# Patient Record
Sex: FEMALE | Race: WHITE | NOT HISPANIC OR LATINO | Employment: UNEMPLOYED | URBAN - METROPOLITAN AREA
[De-identification: names, ages, dates, MRNs, and addresses within clinical notes are randomized per-mention and may not be internally consistent; named-entity substitution may affect disease eponyms.]

---

## 2023-06-15 ENCOUNTER — GYNECOLOGY VISIT (OUTPATIENT)
Dept: OBGYN | Facility: CLINIC | Age: 28
End: 2023-06-15
Payer: MEDICAID

## 2023-06-15 VITALS
HEIGHT: 64 IN | DIASTOLIC BLOOD PRESSURE: 70 MMHG | SYSTOLIC BLOOD PRESSURE: 118 MMHG | WEIGHT: 145.6 LBS | BODY MASS INDEX: 24.86 KG/M2

## 2023-06-15 DIAGNOSIS — Z86.79 HISTORY OF PERICARDITIS: ICD-10-CM

## 2023-06-15 DIAGNOSIS — Z98.891 HISTORY OF C-SECTION: ICD-10-CM

## 2023-06-15 DIAGNOSIS — Z88.9 MULTIPLE ALLERGIES: ICD-10-CM

## 2023-06-15 DIAGNOSIS — N93.8 DUB (DYSFUNCTIONAL UTERINE BLEEDING): ICD-10-CM

## 2023-06-15 DIAGNOSIS — O09.291 HISTORY OF MACROSOMIA IN INFANT IN PRIOR PREGNANCY, CURRENTLY PREGNANT IN FIRST TRIMESTER: ICD-10-CM

## 2023-06-15 DIAGNOSIS — O21.9 NAUSEA/VOMITING IN PREGNANCY: ICD-10-CM

## 2023-06-15 DIAGNOSIS — O09.291 HX OF PREECLAMPSIA, PRIOR PREGNANCY, CURRENTLY PREGNANT, FIRST TRIMESTER: ICD-10-CM

## 2023-06-15 LAB
POCT INT CON NEG: NEGATIVE
POCT INT CON POS: POSITIVE
POCT URINE PREGNANCY TEST: POSITIVE

## 2023-06-15 PROCEDURE — 76805 OB US >/= 14 WKS SNGL FETUS: CPT | Performed by: PHYSICIAN ASSISTANT

## 2023-06-15 PROCEDURE — 3074F SYST BP LT 130 MM HG: CPT | Performed by: PHYSICIAN ASSISTANT

## 2023-06-15 PROCEDURE — 3078F DIAST BP <80 MM HG: CPT | Performed by: PHYSICIAN ASSISTANT

## 2023-06-15 PROCEDURE — 99203 OFFICE O/P NEW LOW 30 MIN: CPT | Performed by: PHYSICIAN ASSISTANT

## 2023-06-15 PROCEDURE — 81025 URINE PREGNANCY TEST: CPT | Performed by: PHYSICIAN ASSISTANT

## 2023-06-15 ASSESSMENT — ENCOUNTER SYMPTOMS
VOMITING: 1
NAUSEA: 1

## 2023-06-15 NOTE — PROGRESS NOTES
Patient here for GYN/DUB  LMP: 2/20/2023  KIMBERLY: 11/27/2023  GA 16w3d  Last pap: 5 yrs ago WNL   Pt states having nausea, vomiting and cramping.   # [phone]  UPT positive

## 2023-06-15 NOTE — PROGRESS NOTES
"Subjective     Khris Mays is a 28 y.o. female who presents with Gynecologic Exam ()  Pt is sure of LMP within a few days. Dating is by LMP c/w US today.   OBHx: Had IOL at 40 wk for preeclampsia, dilated to 8cm then needed primary C/S for FTP (done at [facility]). Pt denies GDM or delivery complications, though baby weighed 9lb3oz. Pt also had \"opening\" of incision (per pt ~2cm) with tunneling 3 wks after delivery, needed wound dressing though pt states she was told she needed another surgery.  PMHx: Pericarditis at 18yo - admitted with arrhythmia. Last seen cardiology 2018 with pregnancy, had Holter monitor, no treatment needed.    SHX: C/S 2018, appendectomy and T&A as child - per pt no surgical or anesthetic complications  Allergies: Tape, PCN (\"I could die\" if taken), \"all vaccines\" cause her to get fever and go to hospital.   Social: Denies tob, etoh or drug use   Meds; Taking PNV only, will add ASA 81mg today  Pt currently denies bleeding or pain though pt was seen in [place] for one episode of bleeding ~3 wks ago, none since. Pt also has had lower abd cramping and significant N/V, unable to eat \"almost anything.\" Handout given and pt to try Unisom and B6, but will call if rx meds are needed. Cramping likely from dehydration, pt to incr water intake when N/V improving. No FM.             HPI    Review of Systems   Gastrointestinal:  Positive for nausea and vomiting.   All other systems reviewed and are negative.             Objective     /70   Ht 5' 4\"   Wt 145 lb 9.6 oz   LMP 02/20/2023   BMI 24.99 kg/m²      Physical Exam  Vitals reviewed.   Constitutional:       Appearance: She is well-developed.   HENT:      Head: Normocephalic and atraumatic.   Eyes:      Pupils: Pupils are equal, round, and reactive to light.   Neck:      Thyroid: No thyromegaly.   Cardiovascular:      Rate and Rhythm: Normal rate and regular rhythm.      Heart sounds: Normal heart sounds.   Pulmonary:      Effort: " "Pulmonary effort is normal. No respiratory distress.      Breath sounds: Normal breath sounds.   Abdominal:      General: Bowel sounds are normal. There is no distension.      Palpations: Abdomen is soft.      Tenderness: There is no abdominal tenderness.   Genitourinary:     Exam position: Supine.      Uterus: Enlarged (Gravid, uterus c/w 16-17 wk size). Not deviated and not tender.    Musculoskeletal:      Cervical back: Normal range of motion and neck supple.   Skin:     General: Skin is warm and dry.      Findings: No erythema.   Neurological:      Mental Status: She is alert.      Deep Tendon Reflexes: Reflexes are normal and symmetric.   Psychiatric:         Behavior: Behavior normal.         Thought Content: Thought content normal.            Abd US shows single viable IUP at 16w6d with KIMBERLY 11/24/23. +FM, +cardiac activity at 145bpm. post placenta, BEBETO subjectively wnl.      BPD: 3.64cm (3.53, 3.78. 3.61)  HC: 13.43cm (13.42, 13.32, 13.56)  AC: 10.87cm (10.91, 10.82)  FL: 2.26cm (2.29, 2.15, 2.35)    Per ACOG guidelines, I would recommend keeping the KIMBERLY based on Patient's last menstrual period was 02/20/2023. as per todays US, the KIMBERLY is only 3 days different. FINAL KIMBERLY 11/27/2023 based on LMP.            Assessment & Plan        1. DUB (dysfunctional uterine bleeding)  - F/u 1-2 wk for NOB visit, will need PAP, PNL, baseline PIH labs, early 1hr GTT  - POCT Pregnancy    2. Hx of preeclampsia, prior pregnancy - IOL at 40wk, ASA started  - Started on ASA 81mg today, will need baseline PIH labs    3. History of pericarditis - last seen cardiology 2018  - Will need cardiology referral at NOB visit    4. History of C/S x 1 for FTP - unsure TOLAC vs Repeat, unsure BTL  - Info given to pt today, risks d/w pt.     5. Multiple allergies - \"all vaccines\" and PCN, tape    6. Nausea/vomiting in pregnancy  - Handout given, Unisom and B6 recommended, pt to call if not improving    7. Hx macrosomia  - Early 1hr GTT needed " at next visit, different FOB

## 2023-06-22 ENCOUNTER — APPOINTMENT (OUTPATIENT)
Dept: OBGYN | Facility: CLINIC | Age: 28
End: 2023-06-22

## 2023-06-23 ENCOUNTER — HOSPITAL ENCOUNTER (OUTPATIENT)
Facility: MEDICAL CENTER | Age: 28
End: 2023-06-23
Attending: PHYSICIAN ASSISTANT
Payer: MEDICAID

## 2023-06-23 ENCOUNTER — INITIAL PRENATAL (OUTPATIENT)
Dept: OBGYN | Facility: CLINIC | Age: 28
End: 2023-06-23
Payer: MEDICAID

## 2023-06-23 VITALS — SYSTOLIC BLOOD PRESSURE: 100 MMHG | WEIGHT: 145 LBS | DIASTOLIC BLOOD PRESSURE: 60 MMHG | BODY MASS INDEX: 24.89 KG/M2

## 2023-06-23 DIAGNOSIS — O09.92 SUPERVISION OF HIGH RISK PREGNANCY, ANTEPARTUM, SECOND TRIMESTER: ICD-10-CM

## 2023-06-23 DIAGNOSIS — O09.291 HX OF PREECLAMPSIA, PRIOR PREGNANCY, CURRENTLY PREGNANT, FIRST TRIMESTER: ICD-10-CM

## 2023-06-23 DIAGNOSIS — Z86.79 HISTORY OF PERICARDITIS: ICD-10-CM

## 2023-06-23 DIAGNOSIS — O09.291 HISTORY OF MACROSOMIA IN INFANT IN PRIOR PREGNANCY, CURRENTLY PREGNANT IN FIRST TRIMESTER: ICD-10-CM

## 2023-06-23 DIAGNOSIS — Z98.891 HISTORY OF C-SECTION: ICD-10-CM

## 2023-06-23 PROBLEM — N93.8 DUB (DYSFUNCTIONAL UTERINE BLEEDING): Status: RESOLVED | Noted: 2023-06-15 | Resolved: 2023-06-23

## 2023-06-23 PROCEDURE — 88175 CYTOPATH C/V AUTO FLUID REDO: CPT

## 2023-06-23 PROCEDURE — 0500F INITIAL PRENATAL CARE VISIT: CPT | Performed by: PHYSICIAN ASSISTANT

## 2023-06-23 PROCEDURE — 3078F DIAST BP <80 MM HG: CPT | Performed by: PHYSICIAN ASSISTANT

## 2023-06-23 PROCEDURE — 3074F SYST BP LT 130 MM HG: CPT | Performed by: PHYSICIAN ASSISTANT

## 2023-06-23 PROCEDURE — 87591 N.GONORRHOEAE DNA AMP PROB: CPT

## 2023-06-23 PROCEDURE — 87491 CHLMYD TRACH DNA AMP PROBE: CPT

## 2023-06-23 ASSESSMENT — EDINBURGH POSTNATAL DEPRESSION SCALE (EPDS)
I HAVE FELT SAD OR MISERABLE: NOT VERY OFTEN
I HAVE BEEN SO UNHAPPY THAT I HAVE BEEN CRYING: NO, NEVER
THE THOUGHT OF HARMING MYSELF HAS OCCURRED TO ME: NEVER
THINGS HAVE BEEN GETTING ON TOP OF ME: NO, MOST OF THE TIME I HAVE COPED QUITE WELL
I HAVE BLAMED MYSELF UNNECESSARILY WHEN THINGS WENT WRONG: NO, NEVER
TOTAL SCORE: 3
I HAVE FELT SCARED OR PANICKY FOR NO GOOD REASON: NO, NOT AT ALL
I HAVE LOOKED FORWARD WITH ENJOYMENT TO THINGS: AS MUCH AS I EVER DID
I HAVE BEEN ANXIOUS OR WORRIED FOR NO GOOD REASON: HARDLY EVER
I HAVE BEEN SO UNHAPPY THAT I HAVE HAD DIFFICULTY SLEEPING: NOT AT ALL
I HAVE BEEN ABLE TO LAUGH AND SEE THE FUNNY SIDE OF THINGS: AS MUCH AS I ALWAYS COULD

## 2023-06-23 ASSESSMENT — ENCOUNTER SYMPTOMS
NAUSEA: 1
VOMITING: 1

## 2023-06-23 NOTE — PROGRESS NOTES
Patient here for  new DUB.  LMP=2/20/23  KIMBERLY= 11/27/23 by LMP  GA= 17w 4d  Last pap: needs one today   Phone number: [phone]  Pharmacy verified  No concerns at the moment.   EPDS : 3

## 2023-06-23 NOTE — PROGRESS NOTES
"Subjective       Khris Mays is a 28 y.o. female who presents with NOB visit today. Pt is sure of LMP within a few days. Dating is by LMP c/w US at 16wks.   OBHx: Had IOL at 40 wk for preeclampsia, dilated to 8cm then needed primary C/S for FTP (done at [facility]). Pt denies GDM or delivery complications, though baby weighed 9lb3oz. Pt also had \"opening\" of incision (per pt ~2cm) with tunneling 3 wks after delivery, needed wound dressing though pt states she was told she needed another surgery.  PMHx: Pericarditis at 18yo - admitted with arrhythmia. Last seen cardiology 2018 with pregnancy, had Holter monitor, no treatment needed.    SHX: C/S 2018, appendectomy and T&A as child - per pt no surgical or anesthetic complications  Allergies: Tape, PCN (\"I could die\" if taken), \"all vaccines\" cause her to get fever and go to hospital.   Social: Denies tob, etoh or drug use   Meds; Taking PNV only, ASA 81mg  Pt currently denies bleeding or pain though pt was seen in [place] for one episode of bleeding ~3 wks ago, none since. Pt also has had lower abd cramping and significant N/V, unable to eat \"almost anything.\" Handout given and pt trying Unisom and B6 with a little relief - will call if rx meds are needed. No FM.     HPI    Review of Systems   Gastrointestinal:  Positive for nausea and vomiting.   All other systems reviewed and are negative.             Objective     /60   Wt 145 lb   LMP 02/20/2023   BMI 24.89 kg/m²      Physical Exam  Vitals reviewed.   Constitutional:       Appearance: She is well-developed.   HENT:      Head: Normocephalic and atraumatic.   Eyes:      Pupils: Pupils are equal, round, and reactive to light.   Neck:      Thyroid: No thyromegaly.   Cardiovascular:      Rate and Rhythm: Normal rate and regular rhythm.      Heart sounds: Normal heart sounds.   Pulmonary:      Effort: Pulmonary effort is normal. No respiratory distress.      Breath sounds: Normal breath sounds.   Abdominal: "      General: Bowel sounds are normal. There is no distension.      Palpations: Abdomen is soft.      Tenderness: There is no abdominal tenderness.   Genitourinary:     Exam position: Supine.      Labia:         Right: No rash or tenderness.         Left: No rash or tenderness.       Vagina: Normal. No signs of injury and foreign body. No vaginal discharge or erythema.      Cervix: No cervical motion tenderness.      Uterus: Enlarged (Gravid, uterus c/w 17wk size). Not deviated and not tender.       Adnexa:         Right: No mass or tenderness.          Left: No mass or tenderness.     Musculoskeletal:      Cervical back: Normal range of motion and neck supple.   Skin:     General: Skin is warm and dry.      Findings: No erythema.   Neurological:      Mental Status: She is alert.      Deep Tendon Reflexes: Reflexes are normal and symmetric.   Psychiatric:         Behavior: Behavior normal.         Thought Content: Thought content normal.                             Assessment & Plan        1. Supervision of high risk pregnancy, antepartum, second trimester  - F/u 4 wk, US 3 wks  - PREG CNTR PRENATAL PN; Future  - URINE DRUG SCREEN W/CONF (AR); Future  - US-OB 2ND 3RD TRI COMPLETE; Future  - THINPREP RFLX HPV ASCUS W/CTNG; Future    2. History of C/S x 1 for FTP - desires repeat, unsure BTL    3. History of macrosomia in infant in prior pregnancy - 9lb3oz  - GLUCOSE 1HR GESTATIONAL; Future    4. History of pericarditis - last seen cardiology 2018  - REFERRAL TO CARDIOLOGY    5. Hx of preeclampsia, prior pregnancy - IOL at 40wk, ASA started  - Comp Metabolic Panel; Future  - PROTEIN/CREAT RATIO URINE; Future

## 2023-06-26 LAB
C TRACH DNA GENITAL QL NAA+PROBE: NEGATIVE
CYTOLOGY REG CYTOL: NORMAL
N GONORRHOEA DNA GENITAL QL NAA+PROBE: NEGATIVE
SPECIMEN SOURCE: NORMAL

## 2023-06-27 ENCOUNTER — TELEPHONE (OUTPATIENT)
Dept: HEALTH INFORMATION MANAGEMENT | Facility: OTHER | Age: 28
End: 2023-06-27

## 2023-06-29 ENCOUNTER — TELEPHONE (OUTPATIENT)
Dept: OBGYN | Facility: CLINIC | Age: 28
End: 2023-06-29

## 2023-06-29 NOTE — TELEPHONE ENCOUNTER
Called pt, in regards to below message pt states was seen at [facility] for first trimester bleeding had U/S done but wasn't given any information, she was given a work note to excuse her from work but they also commented work restriction on note and work is now wanting explanation informed pt that we give our pt.'s a work restriction that states she is not allowed to lift anything greater than 20 pounds and she is allowed 10 minute breaks every two hours, but if [facility] put her on any other restrictions we would need to request her records. Pt states if we could print the work letter for her and if her supervisor needs any additional information she will let us know.     Good morning Timmy,  When I went to the hospital on May 24, the doctor put me on a weight restriction. The hr for my work wants to know about leave of absence. I have a form. Can you please fill out if it is important to turn it in.   Thank you,  Khris Mays

## 2023-06-29 NOTE — LETTER
June 29, 2023            Khris Mays is currently being cared for at [facility].  This patient is pregnant and may continue to work.  She should not lift greater than 20 pounds and requires frequent rest periods (10 minutes every two hours).        Thank you,          Kavin Key P.A-C

## 2023-07-13 ENCOUNTER — HOSPITAL ENCOUNTER (OUTPATIENT)
Dept: LAB | Facility: MEDICAL CENTER | Age: 28
End: 2023-07-13
Attending: PHYSICIAN ASSISTANT
Payer: MEDICAID

## 2023-07-13 DIAGNOSIS — O09.92 SUPERVISION OF HIGH RISK PREGNANCY, ANTEPARTUM, SECOND TRIMESTER: ICD-10-CM

## 2023-07-13 DIAGNOSIS — O09.291 HX OF PREECLAMPSIA, PRIOR PREGNANCY, CURRENTLY PREGNANT, FIRST TRIMESTER: ICD-10-CM

## 2023-07-13 DIAGNOSIS — O09.291 HISTORY OF MACROSOMIA IN INFANT IN PRIOR PREGNANCY, CURRENTLY PREGNANT IN FIRST TRIMESTER: ICD-10-CM

## 2023-07-13 LAB
ABO GROUP BLD: NORMAL
ALBUMIN SERPL BCP-MCNC: 3.9 G/DL (ref 3.2–4.9)
ALBUMIN/GLOB SERPL: 1.6 G/DL
ALP SERPL-CCNC: 70 U/L (ref 30–99)
ALT SERPL-CCNC: 12 U/L (ref 2–50)
ANION GAP SERPL CALC-SCNC: 13 MMOL/L (ref 7–16)
AST SERPL-CCNC: 22 U/L (ref 12–45)
BILIRUB SERPL-MCNC: 0.5 MG/DL (ref 0.1–1.5)
BLD GP AB SCN SERPL QL: NORMAL
BUN SERPL-MCNC: 6 MG/DL (ref 8–22)
CALCIUM ALBUM COR SERPL-MCNC: 9 MG/DL (ref 8.5–10.5)
CALCIUM SERPL-MCNC: 8.9 MG/DL (ref 8.5–10.5)
CHLORIDE SERPL-SCNC: 103 MMOL/L (ref 96–112)
CO2 SERPL-SCNC: 21 MMOL/L (ref 20–33)
CREAT SERPL-MCNC: 0.48 MG/DL (ref 0.5–1.4)
CREAT UR-MCNC: 41.37 MG/DL
ERYTHROCYTE [DISTWIDTH] IN BLOOD BY AUTOMATED COUNT: 48.5 FL (ref 35.9–50)
GFR SERPLBLD CREATININE-BSD FMLA CKD-EPI: 132 ML/MIN/1.73 M 2
GLOBULIN SER CALC-MCNC: 2.5 G/DL (ref 1.9–3.5)
GLUCOSE 1H P 50 G GLC PO SERPL-MCNC: 153 MG/DL (ref 70–139)
GLUCOSE SERPL-MCNC: 151 MG/DL (ref 65–99)
HBV SURFACE AG SER QL: ABNORMAL
HCT VFR BLD AUTO: 39.4 % (ref 37–47)
HCV AB SER QL: ABNORMAL
HGB BLD-MCNC: 13.1 G/DL (ref 12–16)
HIV 1+2 AB+HIV1 P24 AG SERPL QL IA: NORMAL
MCH RBC QN AUTO: 31.6 PG (ref 27–33)
MCHC RBC AUTO-ENTMCNC: 33.2 G/DL (ref 32.2–35.5)
MCV RBC AUTO: 95.2 FL (ref 81.4–97.8)
PLATELET # BLD AUTO: 173 K/UL (ref 164–446)
PMV BLD AUTO: 11.5 FL (ref 9–12.9)
POTASSIUM SERPL-SCNC: 3.9 MMOL/L (ref 3.6–5.5)
PROT SERPL-MCNC: 6.4 G/DL (ref 6–8.2)
PROT UR-MCNC: 4 MG/DL (ref 0–15)
PROT/CREAT UR: 97 MG/G (ref 10–107)
RBC # BLD AUTO: 4.14 M/UL (ref 4.2–5.4)
RH BLD: NORMAL
RUBV AB SER QL: 221 IU/ML
SODIUM SERPL-SCNC: 137 MMOL/L (ref 135–145)
T PALLIDUM AB SER QL IA: ABNORMAL
WBC # BLD AUTO: 7.9 K/UL (ref 4.8–10.8)

## 2023-07-13 PROCEDURE — 87389 HIV-1 AG W/HIV-1&-2 AB AG IA: CPT

## 2023-07-13 PROCEDURE — 86901 BLOOD TYPING SEROLOGIC RH(D): CPT

## 2023-07-13 PROCEDURE — 86850 RBC ANTIBODY SCREEN: CPT

## 2023-07-13 PROCEDURE — 82570 ASSAY OF URINE CREATININE: CPT | Mod: XU

## 2023-07-13 PROCEDURE — 86803 HEPATITIS C AB TEST: CPT

## 2023-07-13 PROCEDURE — 87086 URINE CULTURE/COLONY COUNT: CPT

## 2023-07-13 PROCEDURE — 84156 ASSAY OF PROTEIN URINE: CPT | Mod: XU

## 2023-07-13 PROCEDURE — 36415 COLL VENOUS BLD VENIPUNCTURE: CPT

## 2023-07-13 PROCEDURE — 86780 TREPONEMA PALLIDUM: CPT

## 2023-07-13 PROCEDURE — 86900 BLOOD TYPING SEROLOGIC ABO: CPT

## 2023-07-13 PROCEDURE — 85027 COMPLETE CBC AUTOMATED: CPT

## 2023-07-13 PROCEDURE — 82950 GLUCOSE TEST: CPT

## 2023-07-13 PROCEDURE — 86762 RUBELLA ANTIBODY: CPT

## 2023-07-13 PROCEDURE — 80307 DRUG TEST PRSMV CHEM ANLYZR: CPT

## 2023-07-13 PROCEDURE — 80053 COMPREHEN METABOLIC PANEL: CPT

## 2023-07-13 PROCEDURE — 87340 HEPATITIS B SURFACE AG IA: CPT

## 2023-07-14 DIAGNOSIS — R73.09 ELEVATED GLUCOSE TOLERANCE TEST: ICD-10-CM

## 2023-07-15 LAB
AMPHET CTO UR CFM-MCNC: NEGATIVE NG/ML
BACTERIA UR CULT: NORMAL
BARBITURATES CTO UR CFM-MCNC: NEGATIVE NG/ML
BENZODIAZ CTO UR CFM-MCNC: NEGATIVE NG/ML
CANNABINOIDS CTO UR CFM-MCNC: NEGATIVE NG/ML
COCAINE CTO UR CFM-MCNC: NEGATIVE NG/ML
DRUG COMMENT 753798: NORMAL
METHADONE CTO UR CFM-MCNC: NEGATIVE NG/ML
OPIATES CTO UR CFM-MCNC: NEGATIVE NG/ML
PCP CTO UR CFM-MCNC: NEGATIVE NG/ML
PROPOXYPH CTO UR CFM-MCNC: NEGATIVE NG/ML
SIGNIFICANT IND 70042: NORMAL
SITE SITE: NORMAL
SOURCE SOURCE: NORMAL

## 2023-07-18 ENCOUNTER — TELEPHONE (OUTPATIENT)
Dept: OBGYN | Facility: CLINIC | Age: 28
End: 2023-07-18

## 2023-07-19 NOTE — TELEPHONE ENCOUNTER
----- Message from ADAM Hernandes sent at 7/14/2023  3:30 PM PDT -----  Needs 3hr GTT asap    Pt notified of abnormal 1hr gtt and need to do 3hr gtt this time. Pt instructed to fast 10-12hrs prior to testing. Pt informed she is only allow to drink plain water during fasting time. Explained to pt she can do a walk-in to any [facility] lab, advised pt to to get to the lab early in the morning. Pt notified will be staying in the labs for the 3hr. Pt agreed to do it but unsure of date. Pt verbalized understanding.

## 2023-07-21 ENCOUNTER — APPOINTMENT (OUTPATIENT)
Dept: RADIOLOGY | Facility: IMAGING CENTER | Age: 28
End: 2023-07-21
Attending: PHYSICIAN ASSISTANT
Payer: MEDICAID

## 2023-07-21 ENCOUNTER — ROUTINE PRENATAL (OUTPATIENT)
Dept: OBGYN | Facility: CLINIC | Age: 28
End: 2023-07-21
Payer: MEDICAID

## 2023-07-21 VITALS — SYSTOLIC BLOOD PRESSURE: 122 MMHG | BODY MASS INDEX: 26.54 KG/M2 | WEIGHT: 154.6 LBS | DIASTOLIC BLOOD PRESSURE: 56 MMHG

## 2023-07-21 DIAGNOSIS — O09.92 SUPERVISION OF HIGH RISK PREGNANCY, ANTEPARTUM, SECOND TRIMESTER: ICD-10-CM

## 2023-07-21 DIAGNOSIS — Z34.82 SUPERVISION OF NORMAL INTRAUTERINE PREGNANCY IN MULTIGRAVIDA IN SECOND TRIMESTER: ICD-10-CM

## 2023-07-21 PROCEDURE — 76805 OB US >/= 14 WKS SNGL FETUS: CPT | Mod: TC | Performed by: PHYSICIAN ASSISTANT

## 2023-07-21 PROCEDURE — 0502F SUBSEQUENT PRENATAL CARE: CPT | Performed by: PHYSICIAN ASSISTANT

## 2023-07-21 PROCEDURE — 3074F SYST BP LT 130 MM HG: CPT | Performed by: PHYSICIAN ASSISTANT

## 2023-07-21 PROCEDURE — 3078F DIAST BP <80 MM HG: CPT | Performed by: PHYSICIAN ASSISTANT

## 2023-07-21 RX ORDER — ASPIRIN 81 MG/1
81 TABLET, CHEWABLE ORAL DAILY
COMMUNITY

## 2023-07-21 ASSESSMENT — FIBROSIS 4 INDEX: FIB4 SCORE: 1.03

## 2023-07-21 NOTE — PROGRESS NOTES
"Pt has no complaints with cramping, bleeding or pain, though pt continues to have some nausea. 9lb weight gain as pt has found that she can \"eat [employer] and In [employer] Out.\" PNL wnl, PIH labs wnl (P:C ratio 97), 1hr GTT elevated 151 - will do 3hr GTT soon. US today. Pt hasnt been able to schedule appt with cardiology though will call today. RTC 4 wk or sooner prn.   "

## 2023-07-21 NOTE — PROGRESS NOTES
Pt here today for OB follow up  Reports +FM  WT: 154.6 lb   BP: 122/56  Preferred pharmacy verified with pt.  MFM Appointment: not at this time   US today 07/21/23  Pt states having nausea. States no other complaints today   Good # [phone]    Pt is aware she needs to complete the 3 hr gtt.

## 2023-08-04 ENCOUNTER — TELEPHONE (OUTPATIENT)
Dept: CARDIOLOGY | Facility: MEDICAL CENTER | Age: 28
End: 2023-08-04

## 2023-08-23 ENCOUNTER — OFFICE VISIT (OUTPATIENT)
Dept: CARDIOLOGY | Facility: PHYSICIAN GROUP | Age: 28
End: 2023-08-23
Attending: PHYSICIAN ASSISTANT
Payer: MEDICAID

## 2023-08-23 VITALS
BODY MASS INDEX: 26.84 KG/M2 | RESPIRATION RATE: 12 BRPM | SYSTOLIC BLOOD PRESSURE: 108 MMHG | DIASTOLIC BLOOD PRESSURE: 58 MMHG | WEIGHT: 157.19 LBS | OXYGEN SATURATION: 97 % | HEART RATE: 98 BPM | HEIGHT: 64 IN

## 2023-08-23 DIAGNOSIS — Z87.59 HISTORY OF PRE-ECLAMPSIA: ICD-10-CM

## 2023-08-23 DIAGNOSIS — Z86.79 HISTORY OF PERICARDITIS: ICD-10-CM

## 2023-08-23 PROCEDURE — 3078F DIAST BP <80 MM HG: CPT | Performed by: INTERNAL MEDICINE

## 2023-08-23 PROCEDURE — 3074F SYST BP LT 130 MM HG: CPT | Performed by: INTERNAL MEDICINE

## 2023-08-23 PROCEDURE — 99204 OFFICE O/P NEW MOD 45 MIN: CPT | Performed by: INTERNAL MEDICINE

## 2023-08-23 ASSESSMENT — FIBROSIS 4 INDEX: FIB4 SCORE: 1.03

## 2023-08-23 ASSESSMENT — ENCOUNTER SYMPTOMS
COUGH: 0
PND: 0
DIZZINESS: 0
MYALGIAS: 0
PALPITATIONS: 0
LOSS OF CONSCIOUSNESS: 0
SHORTNESS OF BREATH: 0
ORTHOPNEA: 0

## 2023-08-23 NOTE — PROGRESS NOTES
"    Cardiology Initial Consultation Note    Date of note:    2023    Primary Care Provider: Pcp Pt States None  Referring Provider: Timmy Su P.A.    Patient Name: Khris Mays   YOB: 1995  MRN:              [id number]    Chief Complaint   Patient presents with    New Patient     Dx: History of pericarditis       History of Present Illness: Ms. Khris Mays is a 28-year-old woman with past medical history significant for idiopathic pericarditis initially diagnosed 10 years ago, history of preeclampsia presents to the cardiovascular office for cardiac care given history of recurrent idiopathic pericarditis.    Patient is currently 6 months pregnant and recently saw her OB/GYN for routine obstetric care. Given her history of recurrent idiopathic pericarditis, she was referred to our office for further evaluation. She states that she was initially diagnosed with idiopathic pericarditis 10 years ago.  Since then, has had several sets of recurrence.  Last episode of pericarditis was approximately 1 year ago.  Symptoms respond well to anti-inflammatories with NSAIDs.  Her symptoms of pericarditis include sharp left-sided chest pain that is pleuritic in nature.  Exacerbated with deep breaths.  She has not noticed positional component to her chest pain.     Today, she feels fine from a cardiovascular standpoint.  Denies having chest pain, dyspnea, lower extremity edema, palpitations.  Denies having lightheadedness/dizziness or episodes of syncope.  She is scheduled for  section in November.  States that during her prior pregnancy, she delivered a 9 pound baby with a pregnancy complicated by preeclampsia.  Not currently on any antihypertensive medications with stable blood pressure.    Cardiovascular Risk Factors:  1. Smoking status: Denies  2. Type II Diabetes Mellitus: Denies No results found for: \"HBA1C\"  3. Hypertension: Denies  4. Dyslipidemia: Denies No results found for: " "\"CHOLSTRLTOT\", \"LDL\", \"HDL\", \"TRIGLYCERIDE\"  5. Family history of early Coronary Artery Disease in a first degree relative (Male less than 55 years of age; Female less than 65 years of age): Denies      Review of Systems:  Review of Systems   Constitutional:  Negative for malaise/fatigue.   Respiratory:  Negative for cough and shortness of breath.    Cardiovascular:  Negative for chest pain, palpitations, orthopnea, leg swelling and PND.   Musculoskeletal:  Negative for joint pain and myalgias.   Neurological:  Negative for dizziness and loss of consciousness.       Past Medical History:   Diagnosis Date    Allergy     GERD (gastroesophageal reflux disease)     Head ache     Heart abnormality     paracarditis    Heart murmur     Hypertension     Migraine     Seizure (HCC)     Stroke (HCC)     Pt states they were afraid she was going to have a stroke so they put her on meds.         Past Surgical History:   Procedure Laterality Date    ABDOMINAL EXPLORATION      ADENOIDECTOMY      APPENDECTOMY      PRIMARY C SECTION           Current Outpatient Medications   Medication Sig Dispense Refill    aspirin (ASA) 81 MG Chew Tab chewable tablet Chew 81 mg every day.      Doxylamine Succinate, Sleep, (UNISOM PO) Take  by mouth.      Pyridoxine HCl (VITAMIN B6 PO) Take  by mouth.      Prenatal MV-Min-Fe Fum-FA-DHA (PRENATAL 1 PO) Take  by mouth.       No current facility-administered medications for this visit.         Allergies   Allergen Reactions    Influenza Vaccines      Pt states ends in the hospital with fever 105    Penicillins      Pt states cannot breathe shots off her airway     Tape      Pt states breaks out in a rash     Tetanus-Diphth-Acell Pertussis      \"Ends up in hospital with fever of 105\"         Family History   Problem Relation Age of Onset    No Known Problems Mother     Blood Disease Maternal Grandmother         blood clot    No Known Problems Maternal Grandfather     Heart Disease Paternal Grandmother  " "        Social History     Socioeconomic History    Marital status: Legally      Spouse name: Not on file    Number of children: Not on file    Years of education: Not on file    Highest education level: Not on file   Occupational History    Not on file   Tobacco Use    Smoking status: Never    Smokeless tobacco: Never   Vaping Use    Vaping Use: Never used   Substance and Sexual Activity    Alcohol use: Not Currently    Drug use: Never    Sexual activity: Yes     Partners: Male     Comment: none   Other Topics Concern    Not on file   Social History Narrative    Not on file     Social Determinants of Health     Financial Resource Strain: Not on file   Food Insecurity: Not on file   Transportation Needs: Not on file   Physical Activity: Not on file   Stress: Not on file   Social Connections: Not on file   Intimate Partner Violence: Not on file   Housing Stability: Not on file         Physical Exam:  Ambulatory Vitals  /58 (BP Location: Left arm, Patient Position: Sitting, BP Cuff Size: Adult)   Pulse 98   Resp 12   Ht 1.626 m (5' 4\")   Wt 71.3 kg (157 lb 3 oz)   SpO2 97%    Oxygen Therapy:  Pulse Oximetry: 97 %  BP Readings from Last 4 Encounters:   08/23/23 108/58   07/21/23 122/56   06/23/23 100/60   06/15/23 118/70       Weight/BMI: Body mass index is 26.98 kg/m².  Wt Readings from Last 4 Encounters:   08/23/23 71.3 kg (157 lb 3 oz)   07/21/23 70.1 kg (154 lb 9.6 oz)   06/23/23 65.8 kg (145 lb)   06/15/23 66 kg (145 lb 9.6 oz)         General: Not in acute distress, appears comfortable  HEENT: OP clear   Neck:  No carotid bruits, No JVD appreciated  CVS:  RRR, Normal S1, S2. No murmurs, rubs or gallops  Resp: Normal respiratory effort, lungs CTA bilaterally. No rales or rhonchi  Abdomen: Soft, non-tender to palpation  Skin: No obvious rashes, no cyanosis  Neurological: Alert and oriented x3, moves all extremities, no focal neurologic deficits  Psychiatric: Appropriate affect  Extremities:   " "Extremities warm, no edema, pulses intact throughout      Lab Data Review:  No results found for: \"CHOLSTRLTOT\", \"LDL\", \"HDL\", \"TRIGLYCERIDE\"    Lab Results   Component Value Date/Time    SODIUM 137 07/13/2023 09:54 AM    POTASSIUM 3.9 07/13/2023 09:54 AM    CHLORIDE 103 07/13/2023 09:54 AM    CO2 21 07/13/2023 09:54 AM    GLUCOSE 151 (H) 07/13/2023 09:54 AM    BUN 6 (L) 07/13/2023 09:54 AM    CREATININE 0.48 (L) 07/13/2023 09:54 AM     Lab Results   Component Value Date/Time    ALKPHOSPHAT 70 07/13/2023 09:54 AM    ASTSGOT 22 07/13/2023 09:54 AM    ALTSGPT 12 07/13/2023 09:54 AM    TBILIRUBIN 0.5 07/13/2023 09:54 AM      Lab Results   Component Value Date/Time    WBC 7.9 07/13/2023 09:54 AM    HEMOGLOBIN 13.1 07/13/2023 09:54 AM     No results found for: \"HBA1C\"      Cardiac Imaging and Procedures Review:    EKG dated 8/23/2023:   My personal interpretation is sinus rhythm, nonspecific ST-T wave changes        Assessment & Plan     1. History of pericarditis  EKG      2. History of pre-eclampsia              Medical Decision Making:  Ms. Khris Mays is a 28-year-old woman with past medical history significant for idiopathic pericarditis initially diagnosed 10 years ago, history of preeclampsia presents to the cardiovascular office for cardiac care given history of recurrent idiopathic pericarditis.    1. History of pericarditis  -Initial episode of idiopathic pericarditis occurred over 10 years ago.  She has had recurrent bouts which is responded well to NSAIDs/anti-inflammatory medications.  Has not had pericarditis in the past year.  She is currently asymptomatic without chest pain or dyspnea.  She is stable from a cardiovascular standpoint.  EKG shows sinus rhythm with nonspecific ST-T wave changes.  No findings suggestive acute pericarditis.  She had echocardiogram performed at [facility] a few years ago.  We will obtain records for last echo.  Expectant management.     2. History of " pre-eclampsia  -Known history of preeclampsia.  Blood pressure in office today is currently well controlled at 108/58 mmHg.  Not currently on antihypertensive medications.  She is under the care of MFM/OB/GYN given history of preeclampsia.  She was recently started on aspirin and antiemetics by her OB/GYN.  At this time, no need for antihypertensive medications given stable blood pressure.  If she were to develop elevated blood pressure during her pregnancy, will recommend initiating oral beta-blocker therapy for blood pressure control.       It was a pleasure seeing Ms. Khris Mays in the office today. Return if symptoms worsen or fail to improve. Patient is aware to call the cardiology clinic with any questions or concerns.      Jordon Lanier MD, [facility]  Cardiologist, [facility], [facility]  [address] [address]  Phone: [phone]  Fax: [phone]    Please note that this dictation was created using voice recognition software. I have made every reasonable attempt to correct obvious errors, but it is possible there are errors of grammar and possibly content that I did not discover before finalizing the note.

## 2023-08-24 ENCOUNTER — ROUTINE PRENATAL (OUTPATIENT)
Dept: OBGYN | Facility: CLINIC | Age: 28
End: 2023-08-24
Payer: MEDICAID

## 2023-08-24 VITALS — DIASTOLIC BLOOD PRESSURE: 60 MMHG | SYSTOLIC BLOOD PRESSURE: 112 MMHG | WEIGHT: 157.4 LBS | BODY MASS INDEX: 27.02 KG/M2

## 2023-08-24 DIAGNOSIS — Z34.82 SUPERVISION OF NORMAL INTRAUTERINE PREGNANCY IN MULTIGRAVIDA IN SECOND TRIMESTER: ICD-10-CM

## 2023-08-24 DIAGNOSIS — R73.09 ELEVATED GLUCOSE TOLERANCE TEST: ICD-10-CM

## 2023-08-24 PROCEDURE — 3074F SYST BP LT 130 MM HG: CPT | Performed by: PHYSICIAN ASSISTANT

## 2023-08-24 PROCEDURE — 0502F SUBSEQUENT PRENATAL CARE: CPT | Performed by: PHYSICIAN ASSISTANT

## 2023-08-24 PROCEDURE — 3078F DIAST BP <80 MM HG: CPT | Performed by: PHYSICIAN ASSISTANT

## 2023-08-24 RX ORDER — ONDANSETRON 4 MG/1
4 TABLET, ORALLY DISINTEGRATING ORAL EVERY 6 HOURS PRN
Qty: 20 TABLET | Refills: 0 | Status: SHIPPED | OUTPATIENT
Start: 2023-08-24 | End: 2023-10-03 | Stop reason: SDUPTHER

## 2023-08-24 ASSESSMENT — FIBROSIS 4 INDEX: FIB4 SCORE: 1.03

## 2023-08-24 NOTE — PROGRESS NOTES
Pt has no complaints with cramping, bleeding or pain. +FM. Pt has continued N/V, worse in afternoons only. Pt is concerned about nutrients, but eats well before 4pm and taking PNV daily, so pt reassured. Will call in [place] for pt continued vomiting. Cardiology appt this week - all wnl, no recommendations or treatment needed, no f/u. 3hr GTT wnl from [facility] - pt notified and no further testing needed, as pt did at 26wks.     Pt wants repeat C/S - states she had issues with nerves in her back, will plan to talk to anesthesia before surgery. No prior appt needed as pt had spinal previously and it was effective. US wnl - pt aware. RTC 4 wk or sooner prn.

## 2023-08-24 NOTE — PROGRESS NOTES
Pt. Here for OB/FU. Reports Good FM.   Good # [phone]  Pt states having nausea and vomiting in the afternoon.   Pt states did 3 HR GTT on Tuesday had it done at [facility] also had appt with Cardiologist yesterday

## 2023-09-21 ENCOUNTER — ROUTINE PRENATAL (OUTPATIENT)
Dept: OBGYN | Facility: CLINIC | Age: 28
End: 2023-09-21
Payer: MEDICAID

## 2023-09-21 VITALS — SYSTOLIC BLOOD PRESSURE: 110 MMHG | WEIGHT: 162 LBS | DIASTOLIC BLOOD PRESSURE: 60 MMHG | BODY MASS INDEX: 27.81 KG/M2

## 2023-09-21 DIAGNOSIS — O09.93 SUPERVISION OF HIGH-RISK PREGNANCY, THIRD TRIMESTER: Primary | ICD-10-CM

## 2023-09-21 PROCEDURE — 3074F SYST BP LT 130 MM HG: CPT | Performed by: NURSE PRACTITIONER

## 2023-09-21 PROCEDURE — 0502F SUBSEQUENT PRENATAL CARE: CPT | Performed by: NURSE PRACTITIONER

## 2023-09-21 PROCEDURE — 3078F DIAST BP <80 MM HG: CPT | Performed by: NURSE PRACTITIONER

## 2023-09-21 ASSESSMENT — FIBROSIS 4 INDEX: FIB4 SCORE: 1.03

## 2023-09-21 NOTE — LETTER
"Count Your Baby's Movements  Another step to a healthy delivery  Dept: [phone]    How Many Weeks Pregnant? 30w3d    Date to Begin Countin23              How to use this chart    One way for your physician to keep track of your baby's health is by knowing how often the baby moves (or \"kicks\") in your womb.  You can help your physician to do this by using this chart every day.    Every day, you should see how many hours it takes for your baby to move 10 times.  Start in the morning, as soon as you get up.    First, write down the time your baby moves until you get to 10.  Check off one box every time your baby moves until you get to 10.  Write down the time you finished counting in the last column.  Total how long it took to count up all 10 movements.  Finally, fill in the box that shows how long this took.  After counting 10 movements, you no longer have to count any more that day.  The next morning, just start counting again as soon as you get up.    What should you call a \"movement\"?  It is hard to say, because it will feel different from one mother to another and from one pregnancy to the next.  The important thing is that you count the movements the same way throughout your pregnancy.  If you have more questions, you should ask your physician.    Count carefully every day!  SAMPLE:  Week 28    How many hours did it take to feel 10 movements?       Start  Time     1     2     3     4     5     6     7     8     9     10   Finish Time   Mon 8:20           11:40                  Fri               Sat               Sun                 IMPORTANT: You should contact your physician if it takes more than two hours for you to feel 10 movements.  Each morning, write down the time and start to count the movements of your baby.  Keep track by checking off one box every time you feel one movement.  When you have felt 10 \"kicks\", write down the time you finished counting in " the last column.  Then fill in the   box (over the check harish) for the number of hours it took.  Be sure to read the complete instructions on the previous page.

## 2023-09-21 NOTE — PROGRESS NOTES
Pt here today for OB follow up  Pt states no complaints  Reports +FM  Good # [phone] (home)    Pharmacy Confirmed.  Chaperone offered and declined.    Declined TDAP wants to think about BTL  ABDELRAHMAN given and explained to pt

## 2023-09-21 NOTE — PROGRESS NOTES
"S) Pt is a 28 y.o.   at 30w3d  gestation. Routine prenatal care today. No concerns today. All caught up with labs and US, ordered CBC and RPR today. 3 hour glucose testing done at 26 weeks, and WNL. Declines Tdap today, and unsure about BTL. Discussed signing form in timely manner, so will discuss again next visit. PTL precautions reviewed, all questions answered.    Fetal movement Normal  Cramping no  VB no  LOF no   Denies dysuria. Generally feels well today. Good self-care activities identified. Denies headaches, swelling, visual changes, or epigastric pain .     O) /60   Wt 162 lb         Labs:       PNL: WNL       GCT: 153, but normal 3 hour        AFP: Not Examined       GBS: N/A       Pertinent ultrasound -        2023- Survey WNL, BEBETO 21.03cm, c/w prev dating. EFW 72.3%    A) IUP at 30w3d       S=D         Patient Active Problem List    Diagnosis Date Noted    History of pre-eclampsia 2023    Supervision of normal intrauterine pregnancy in multigravida in second trimester 2023    Elevated 1hr  --> 3hr GTT wnl - see media 2023    Hx of preeclampsia, prior pregnancy - IOL at 40wk, ASA started 06/15/2023    History of pericarditis - cardiology appt wnl, no f/u or recommendations needed 06/15/2023    History of C/S x 1 for FTP - desires repeat, unsure BTL 06/15/2023    Multiple allergies - \"all vaccines\" and PCN, tape 06/15/2023    Nausea/vomiting in pregnancy 06/15/2023    History of macrosomia in infant in prior pregnancy - 9lb3oz 06/15/2023          SVE: deferred       Chaperone offered: n/a         TDAP: no       FLU: no        BTL: no       : no       C/S Consent: no       IOL or C/S scheduled: no       LAST PAP: 2023- NILM         P) s/s ptl vs general discomforts. Fetal movements reviewed. General ed and anticipatory guidance. Nutrition/exercise/vitamin. Plans breast Plans pp contraception- unsure  Continue PNV.   "

## 2023-10-03 ENCOUNTER — ROUTINE PRENATAL (OUTPATIENT)
Dept: OBGYN | Facility: CLINIC | Age: 28
End: 2023-10-03
Payer: MEDICAID

## 2023-10-03 ENCOUNTER — APPOINTMENT (OUTPATIENT)
Dept: OBGYN | Facility: CLINIC | Age: 28
End: 2023-10-03
Payer: MEDICAID

## 2023-10-03 VITALS — SYSTOLIC BLOOD PRESSURE: 106 MMHG | WEIGHT: 167 LBS | BODY MASS INDEX: 28.67 KG/M2 | DIASTOLIC BLOOD PRESSURE: 64 MMHG

## 2023-10-03 DIAGNOSIS — Z34.82 SUPERVISION OF NORMAL INTRAUTERINE PREGNANCY IN MULTIGRAVIDA IN SECOND TRIMESTER: ICD-10-CM

## 2023-10-03 DIAGNOSIS — O09.291 HISTORY OF MACROSOMIA IN INFANT IN PRIOR PREGNANCY, CURRENTLY PREGNANT IN FIRST TRIMESTER: ICD-10-CM

## 2023-10-03 PROBLEM — R73.09 ELEVATED GLUCOSE TOLERANCE TEST: Status: RESOLVED | Noted: 2023-07-14 | Resolved: 2023-10-03

## 2023-10-03 PROCEDURE — 3074F SYST BP LT 130 MM HG: CPT | Performed by: PHYSICIAN ASSISTANT

## 2023-10-03 PROCEDURE — 3078F DIAST BP <80 MM HG: CPT | Performed by: PHYSICIAN ASSISTANT

## 2023-10-03 PROCEDURE — 0502F SUBSEQUENT PRENATAL CARE: CPT | Performed by: PHYSICIAN ASSISTANT

## 2023-10-03 RX ORDER — ONDANSETRON 4 MG/1
4 TABLET, ORALLY DISINTEGRATING ORAL EVERY 6 HOURS PRN
Qty: 20 TABLET | Refills: 1 | Status: SHIPPED | OUTPATIENT
Start: 2023-10-03 | End: 2023-10-31

## 2023-10-03 ASSESSMENT — FIBROSIS 4 INDEX: FIB4 SCORE: 1.03

## 2023-10-03 NOTE — PROGRESS NOTES
Pt. Here for OB/FU. Reports Good FM.   Good # verified.  Pt. Denies VB, LOF, or UC's.   Pharmacy verified.   Chaperone offered and not indicated.

## 2023-10-03 NOTE — PROGRESS NOTES
S: 28 y.o.  at 32w1d presents for routine obstetric follow-up.   Good fetal movement.  No contractions, vaginal bleeding, or leakage of fluid.    Questions answered.    O: /64   Wt 167 lb   LMP 2023   BMI 28.67 kg/m²   Patients' weight gain, fluid intake and exercise level discussed.  Vitals, fundal height , fetal position, and FHR reviewed on flowsheet    TDaP: Declines, education provided  GBS: N/A  Rh: positive  BTL: consent signed 10/3/23,  still undecided     A/P:  28 y.o.  at 32w1d presents for routine obstetric follow-up.  Size equals dates and/or scan    - Passed 3hGTT, CBC and RPR pending with [facility]  - Fetal kick counts.  - Exercise at least 30 minutes daily. Drink at least 3-4L of water daily  - PTL precautions educated.    Follow-up in 2 weeks.    Yue Key P.A.-C.

## 2023-10-05 DIAGNOSIS — O09.93 SUPERVISION OF HIGH-RISK PREGNANCY, THIRD TRIMESTER: ICD-10-CM

## 2023-10-23 ENCOUNTER — ROUTINE PRENATAL (OUTPATIENT)
Dept: OBGYN | Facility: CLINIC | Age: 28
End: 2023-10-23
Payer: MEDICAID

## 2023-10-23 VITALS — SYSTOLIC BLOOD PRESSURE: 104 MMHG | WEIGHT: 173 LBS | BODY MASS INDEX: 29.7 KG/M2 | DIASTOLIC BLOOD PRESSURE: 72 MMHG

## 2023-10-23 DIAGNOSIS — D69.6 THROMBOCYTOPENIA (HCC): ICD-10-CM

## 2023-10-23 DIAGNOSIS — Z98.891 HISTORY OF C-SECTION: ICD-10-CM

## 2023-10-23 PROCEDURE — 0502F SUBSEQUENT PRENATAL CARE: CPT | Performed by: OBSTETRICS & GYNECOLOGY

## 2023-10-23 PROCEDURE — 3074F SYST BP LT 130 MM HG: CPT | Performed by: OBSTETRICS & GYNECOLOGY

## 2023-10-23 PROCEDURE — 3078F DIAST BP <80 MM HG: CPT | Performed by: OBSTETRICS & GYNECOLOGY

## 2023-10-23 ASSESSMENT — FIBROSIS 4 INDEX: FIB4 SCORE: 1.03

## 2023-10-23 NOTE — PROGRESS NOTES
"OB Followup;    35w0d    Patient Active Problem List    Diagnosis Date Noted    History of pre-eclampsia 2023    Supervision of normal intrauterine pregnancy in multigravida in second trimester 2023    Hx of preeclampsia, prior pregnancy - C/S at 40wk, ASA started 06/15/2023    History of pericarditis - cardiology appt wnl, no f/u or recommendations needed 06/15/2023    History of C/S x 1 for FTP - desires repeat, unsure BTL 06/15/2023    Multiple allergies - \"all vaccines\" and PCN, tape 06/15/2023    Nausea/vomiting in pregnancy 06/15/2023    History of macrosomia in infant in prior pregnancy - 9lb3oz 06/15/2023       Vitals:    10/23/23 0820   BP: 104/72   Weight: 173 lb       Patient presents for followup of OB care. Currently doing well . Good fetal movement no leakage of fluid no contractions or vaginal bleeding        Size equals dates, normal fetal heart rate      Review of 26-week labs;  Failed 1 hour GTT, 3-hour GTT normal  RPR negative, CBC shows platelet count 108,000-low    Patient request repeat  section with bilateral salpingectomy-referral placed-BTL paperwork signed    Labor precautions given  Discussed proper weight gain during pregnancy.    Signs and symptoms of labor/ labor discussed   Discussed proper exercise during pregnancy  Discussed proper oral fluid hydration  Reviewed fetal kick counts and appropriate fetal movement during pregnancy  Reviewed postpartum birth control methods  All questions answered in detail    Followup in  1 weeks    "

## 2023-10-30 ENCOUNTER — APPOINTMENT (OUTPATIENT)
Dept: ADMISSIONS | Facility: MEDICAL CENTER | Age: 28
End: 2023-10-30
Attending: OBSTETRICS & GYNECOLOGY
Payer: MEDICAID

## 2023-10-31 ENCOUNTER — HOSPITAL ENCOUNTER (OUTPATIENT)
Facility: MEDICAL CENTER | Age: 28
End: 2023-10-31
Attending: NURSE PRACTITIONER
Payer: MEDICAID

## 2023-10-31 ENCOUNTER — ROUTINE PRENATAL (OUTPATIENT)
Dept: OBGYN | Facility: CLINIC | Age: 28
End: 2023-10-31
Payer: MEDICAID

## 2023-10-31 VITALS — SYSTOLIC BLOOD PRESSURE: 100 MMHG | WEIGHT: 172 LBS | BODY MASS INDEX: 29.52 KG/M2 | DIASTOLIC BLOOD PRESSURE: 78 MMHG

## 2023-10-31 DIAGNOSIS — Z98.891 HISTORY OF C-SECTION: ICD-10-CM

## 2023-10-31 PROCEDURE — 3078F DIAST BP <80 MM HG: CPT | Performed by: NURSE PRACTITIONER

## 2023-10-31 PROCEDURE — 87150 DNA/RNA AMPLIFIED PROBE: CPT

## 2023-10-31 PROCEDURE — 87081 CULTURE SCREEN ONLY: CPT

## 2023-10-31 PROCEDURE — 3074F SYST BP LT 130 MM HG: CPT | Performed by: NURSE PRACTITIONER

## 2023-10-31 PROCEDURE — 0502F SUBSEQUENT PRENATAL CARE: CPT | Performed by: NURSE PRACTITIONER

## 2023-10-31 ASSESSMENT — FIBROSIS 4 INDEX: FIB4 SCORE: 1.03

## 2023-10-31 NOTE — PROGRESS NOTES
"SUBJECTIVE:  Pt is a 28 y.o.   at 36w1d  gestation. Presents today for follow-up prenatal care. Reports good  fetal movement. Denies regular cramping/contractions, bleeding or leaking of fluid. Denies dysuria, headaches, N/V. Generally feels well today except jest ready to be done.      OBJECTIVE:  - See prenatal vitals flow  -   Vitals:    10/31/23 0753   BP: 100/78   Weight: 172 lb                 ASSESSMENT:   - IUP at 36w1d    - S=D   -   Patient Active Problem List    Diagnosis Date Noted    Thrombocytopenia (HCC) 10/23/2023    Hx of preeclampsia, prior pregnancy - C/S at 40wk, ASA started 06/15/2023    History of pericarditis - cardiology appt wnl, no f/u or recommendations needed 06/15/2023    History of C/S x 1 for FTP - desires repeat, unsure BTL 06/15/2023    Multiple allergies - \"all vaccines\" and PCN, tape 06/15/2023    History of macrosomia in infant in prior pregnancy - 9lb3oz 06/15/2023         PLAN:  - S/sx pregnancy and labor warning signs vs general discomforts discussed  - Fetal movements and/or kick counts reviewed   - Adequate hydration reinforced  - Nutrition/exercise/vitamin education; continue PNV  - Planning BTL with c/s; she has her c/s date  - GBS collected   - Anticipatory guidance given  - RTC in 1 weeks for follow-up prenatal care     "

## 2023-10-31 NOTE — PROGRESS NOTES
Pt. Here for OB/FU.   Reports Good FM.   Pt. Denies VB, LOF, or UC's.   Chaperone offered and indicated.   GBS today.   Pt states she has no concerns today.

## 2023-11-01 LAB — GP B STREP DNA SPEC QL NAA+PROBE: NEGATIVE

## 2023-11-06 ENCOUNTER — PRE-ADMISSION TESTING (OUTPATIENT)
Dept: ADMISSIONS | Facility: MEDICAL CENTER | Age: 28
End: 2023-11-06
Attending: OBSTETRICS & GYNECOLOGY
Payer: MEDICAID

## 2023-11-06 ENCOUNTER — ROUTINE PRENATAL (OUTPATIENT)
Dept: OBGYN | Facility: CLINIC | Age: 28
End: 2023-11-06
Payer: MEDICAID

## 2023-11-06 VITALS — DIASTOLIC BLOOD PRESSURE: 70 MMHG | WEIGHT: 175 LBS | BODY MASS INDEX: 30.04 KG/M2 | SYSTOLIC BLOOD PRESSURE: 106 MMHG

## 2023-11-06 DIAGNOSIS — T78.40XA ALLERGY, INITIAL ENCOUNTER: ICD-10-CM

## 2023-11-06 DIAGNOSIS — O09.93 HIGH-RISK PREGNANCY IN THIRD TRIMESTER: Primary | ICD-10-CM

## 2023-11-06 PROCEDURE — 3074F SYST BP LT 130 MM HG: CPT | Performed by: OBSTETRICS & GYNECOLOGY

## 2023-11-06 PROCEDURE — 0502F SUBSEQUENT PRENATAL CARE: CPT | Performed by: OBSTETRICS & GYNECOLOGY

## 2023-11-06 PROCEDURE — 3078F DIAST BP <80 MM HG: CPT | Performed by: OBSTETRICS & GYNECOLOGY

## 2023-11-06 RX ORDER — ONDANSETRON 4 MG/1
4 TABLET, ORALLY DISINTEGRATING ORAL EVERY 6 HOURS PRN
COMMUNITY
End: 2024-02-07

## 2023-11-06 RX ORDER — CETIRIZINE HYDROCHLORIDE 10 MG/1
10 TABLET ORAL DAILY
Qty: 30 TABLET | Refills: 1 | Status: ON HOLD | OUTPATIENT
Start: 2023-11-06 | End: 2023-11-20

## 2023-11-06 ASSESSMENT — FIBROSIS 4 INDEX: FIB4 SCORE: 1.03

## 2023-11-06 NOTE — PROGRESS NOTES
S: Pt presents for routine OB follow up. Good fetal movement. No persistent contractions, vaginal bleeding, or leakage of fluid. Reports allergy symptoms since yesterday including nasal drainage and cough.  No fevers.  She has not taken any medication.  Questions answered.    O: /70   Wt 175 lb   LMP 2023   BMI 30.04 kg/m²   Patients' weight gain, fluid intake and exercise level discussed.  Vitals, fundal height , fetal position, and FHR reviewed on flowsheet    Lab:  Recent Results (from the past 336 hour(s))   GRP B STREP, BY PCR (LUIS BROTH)    Collection Time: 10/31/23  9:07 AM    Specimen: Genital   Result Value Ref Range    Strep Gp B DNA PCR Negative Negative       A/P:  28 y.o.  at 37w0d presents for routine obstetric follow-up.  Size equals dates and/or scan    Diagnoses and all orders for this visit:    High-risk pregnancy in third trimester  - has repeat  scheduled    Allergy, initial encounter  - discussed that she can take zyrtec, precautions given to be seen for fevers  -     cetirizine (ZYRTEC ALLERGY) 10 MG Tab; Take 1 Tablet by mouth every day.    1.  Continue prenatal vitamins.  2.  Fetal kick counts.  3.  Exercise at least 30 minutes daily.  4.  Drink at least 2L of water daily  5.  Labor precautions educated.  6.  Follow-up in 1 weeks.    Shae Harmon M.D.

## 2023-11-13 ENCOUNTER — ROUTINE PRENATAL (OUTPATIENT)
Dept: OBGYN | Facility: CLINIC | Age: 28
End: 2023-11-13
Payer: MEDICAID

## 2023-11-13 VITALS — BODY MASS INDEX: 30.38 KG/M2 | DIASTOLIC BLOOD PRESSURE: 70 MMHG | SYSTOLIC BLOOD PRESSURE: 110 MMHG | WEIGHT: 177 LBS

## 2023-11-13 DIAGNOSIS — Z34.83 ENCOUNTER FOR SUPERVISION OF OTHER NORMAL PREGNANCY, THIRD TRIMESTER: ICD-10-CM

## 2023-11-13 PROCEDURE — 3074F SYST BP LT 130 MM HG: CPT | Performed by: ADVANCED PRACTICE MIDWIFE

## 2023-11-13 PROCEDURE — 0502F SUBSEQUENT PRENATAL CARE: CPT | Performed by: ADVANCED PRACTICE MIDWIFE

## 2023-11-13 PROCEDURE — 3078F DIAST BP <80 MM HG: CPT | Performed by: ADVANCED PRACTICE MIDWIFE

## 2023-11-13 ASSESSMENT — FIBROSIS 4 INDEX: FIB4 SCORE: 1.03

## 2023-11-20 ENCOUNTER — ANESTHESIA EVENT (OUTPATIENT)
Dept: OBGYN | Facility: MEDICAL CENTER | Age: 28
End: 2023-11-20
Payer: MEDICAID

## 2023-11-20 ENCOUNTER — ANESTHESIA EVENT (OUTPATIENT)
Dept: ANESTHESIOLOGY | Facility: MEDICAL CENTER | Age: 28
End: 2023-11-20
Payer: MEDICAID

## 2023-11-20 ENCOUNTER — ANESTHESIA (OUTPATIENT)
Dept: ANESTHESIOLOGY | Facility: MEDICAL CENTER | Age: 28
End: 2023-11-20
Payer: MEDICAID

## 2023-11-20 ENCOUNTER — HOSPITAL ENCOUNTER (INPATIENT)
Facility: MEDICAL CENTER | Age: 28
LOS: 1 days | End: 2023-11-21
Attending: OBSTETRICS & GYNECOLOGY | Admitting: OBSTETRICS & GYNECOLOGY
Payer: MEDICAID

## 2023-11-20 ENCOUNTER — ANESTHESIA (OUTPATIENT)
Dept: OBGYN | Facility: MEDICAL CENTER | Age: 28
End: 2023-11-20
Payer: MEDICAID

## 2023-11-20 DIAGNOSIS — Z98.891 HISTORY OF C-SECTION: ICD-10-CM

## 2023-11-20 LAB
BASOPHILS # BLD AUTO: 0.3 % (ref 0–1.8)
BASOPHILS # BLD: 0.03 K/UL (ref 0–0.12)
EOSINOPHIL # BLD AUTO: 0.09 K/UL (ref 0–0.51)
EOSINOPHIL NFR BLD: 0.9 % (ref 0–6.9)
ERYTHROCYTE [DISTWIDTH] IN BLOOD BY AUTOMATED COUNT: 46.1 FL (ref 35.9–50)
HCT VFR BLD AUTO: 45.3 % (ref 37–47)
HGB BLD-MCNC: 15.6 G/DL (ref 12–16)
HOLDING TUBE BB 8507: NORMAL
IMM GRANULOCYTES # BLD AUTO: 0.23 K/UL (ref 0–0.11)
IMM GRANULOCYTES NFR BLD AUTO: 2.3 % (ref 0–0.9)
LYMPHOCYTES # BLD AUTO: 1.75 K/UL (ref 1–4.8)
LYMPHOCYTES NFR BLD: 17.7 % (ref 22–41)
MCH RBC QN AUTO: 31.5 PG (ref 27–33)
MCHC RBC AUTO-ENTMCNC: 34.4 G/DL (ref 32.2–35.5)
MCV RBC AUTO: 91.5 FL (ref 81.4–97.8)
MONOCYTES # BLD AUTO: 0.42 K/UL (ref 0–0.85)
MONOCYTES NFR BLD AUTO: 4.3 % (ref 0–13.4)
NEUTROPHILS # BLD AUTO: 7.35 K/UL (ref 1.82–7.42)
NEUTROPHILS NFR BLD: 74.5 % (ref 44–72)
NRBC # BLD AUTO: 0 K/UL
NRBC BLD-RTO: 0 /100 WBC (ref 0–0.2)
PLATELET # BLD AUTO: 114 K/UL (ref 164–446)
PMV BLD AUTO: 12.4 FL (ref 9–12.9)
RBC # BLD AUTO: 4.95 M/UL (ref 4.2–5.4)
T PALLIDUM AB SER QL IA: NORMAL
WBC # BLD AUTO: 9.9 K/UL (ref 4.8–10.8)

## 2023-11-20 PROCEDURE — A9270 NON-COVERED ITEM OR SERVICE: HCPCS | Performed by: ANESTHESIOLOGY

## 2023-11-20 PROCEDURE — 160041 HCHG SURGERY MINUTES - EA ADDL 1 MIN LEVEL 4: Performed by: OBSTETRICS & GYNECOLOGY

## 2023-11-20 PROCEDURE — 36415 COLL VENOUS BLD VENIPUNCTURE: CPT

## 2023-11-20 PROCEDURE — C1755 CATHETER, INTRASPINAL: HCPCS | Performed by: OBSTETRICS & GYNECOLOGY

## 2023-11-20 PROCEDURE — 700111 HCHG RX REV CODE 636 W/ 250 OVERRIDE (IP): Performed by: OBSTETRICS & GYNECOLOGY

## 2023-11-20 PROCEDURE — A9270 NON-COVERED ITEM OR SERVICE: HCPCS | Performed by: STUDENT IN AN ORGANIZED HEALTH CARE EDUCATION/TRAINING PROGRAM

## 2023-11-20 PROCEDURE — 700111 HCHG RX REV CODE 636 W/ 250 OVERRIDE (IP): Mod: JZ | Performed by: OBSTETRICS & GYNECOLOGY

## 2023-11-20 PROCEDURE — 85025 COMPLETE CBC W/AUTO DIFF WBC: CPT

## 2023-11-20 PROCEDURE — 160029 HCHG SURGERY MINUTES - 1ST 30 MINS LEVEL 4: Performed by: OBSTETRICS & GYNECOLOGY

## 2023-11-20 PROCEDURE — 770002 HCHG ROOM/CARE - OB PRIVATE (112)

## 2023-11-20 PROCEDURE — 700105 HCHG RX REV CODE 258: Performed by: ANESTHESIOLOGY

## 2023-11-20 PROCEDURE — 700102 HCHG RX REV CODE 250 W/ 637 OVERRIDE(OP): Performed by: STUDENT IN AN ORGANIZED HEALTH CARE EDUCATION/TRAINING PROGRAM

## 2023-11-20 PROCEDURE — 700111 HCHG RX REV CODE 636 W/ 250 OVERRIDE (IP): Mod: JZ | Performed by: ANESTHESIOLOGY

## 2023-11-20 PROCEDURE — 59510 CESAREAN DELIVERY: CPT | Performed by: OBSTETRICS & GYNECOLOGY

## 2023-11-20 PROCEDURE — 160035 HCHG PACU - 1ST 60 MINS PHASE I: Performed by: OBSTETRICS & GYNECOLOGY

## 2023-11-20 PROCEDURE — 160048 HCHG OR STATISTICAL LEVEL 1-5: Performed by: OBSTETRICS & GYNECOLOGY

## 2023-11-20 PROCEDURE — 86780 TREPONEMA PALLIDUM: CPT

## 2023-11-20 PROCEDURE — 700102 HCHG RX REV CODE 250 W/ 637 OVERRIDE(OP): Performed by: ANESTHESIOLOGY

## 2023-11-20 PROCEDURE — 700101 HCHG RX REV CODE 250: Performed by: ANESTHESIOLOGY

## 2023-11-20 PROCEDURE — 160009 HCHG ANES TIME/MIN: Performed by: OBSTETRICS & GYNECOLOGY

## 2023-11-20 PROCEDURE — 160002 HCHG RECOVERY MINUTES (STAT): Performed by: OBSTETRICS & GYNECOLOGY

## 2023-11-20 RX ORDER — OXYCODONE HYDROCHLORIDE 5 MG/1
5 TABLET ORAL EVERY 4 HOURS PRN
Status: DISCONTINUED | OUTPATIENT
Start: 2023-11-21 | End: 2023-11-21 | Stop reason: HOSPADM

## 2023-11-20 RX ORDER — AZITHROMYCIN 500 MG/5ML
500 INJECTION, POWDER, LYOPHILIZED, FOR SOLUTION INTRAVENOUS ONCE
Status: COMPLETED | OUTPATIENT
Start: 2023-11-20 | End: 2023-11-20

## 2023-11-20 RX ORDER — ACETAMINOPHEN 500 MG
1000 TABLET ORAL EVERY 6 HOURS
Status: DISCONTINUED | OUTPATIENT
Start: 2023-11-21 | End: 2023-11-21 | Stop reason: HOSPADM

## 2023-11-20 RX ORDER — PHENYLEPHRINE HYDROCHLORIDE 10 MG/ML
INJECTION, SOLUTION INTRAMUSCULAR; INTRAVENOUS; SUBCUTANEOUS PRN
Status: DISCONTINUED | OUTPATIENT
Start: 2023-11-20 | End: 2023-11-20 | Stop reason: HOSPADM

## 2023-11-20 RX ORDER — OXYTOCIN 10 [USP'U]/ML
10 INJECTION, SOLUTION INTRAMUSCULAR; INTRAVENOUS
Status: DISCONTINUED | OUTPATIENT
Start: 2023-11-20 | End: 2023-11-21 | Stop reason: HOSPADM

## 2023-11-20 RX ORDER — MORPHINE SULFATE 0.5 MG/ML
INJECTION, SOLUTION EPIDURAL; INTRATHECAL; INTRAVENOUS
Status: COMPLETED | OUTPATIENT
Start: 2023-11-20 | End: 2023-11-20

## 2023-11-20 RX ORDER — SODIUM CHLORIDE, SODIUM LACTATE, POTASSIUM CHLORIDE, CALCIUM CHLORIDE 600; 310; 30; 20 MG/100ML; MG/100ML; MG/100ML; MG/100ML
INJECTION, SOLUTION INTRAVENOUS CONTINUOUS
Status: DISCONTINUED | OUTPATIENT
Start: 2023-11-20 | End: 2023-11-21 | Stop reason: HOSPADM

## 2023-11-20 RX ORDER — SODIUM CHLORIDE, SODIUM GLUCONATE, SODIUM ACETATE, POTASSIUM CHLORIDE AND MAGNESIUM CHLORIDE 526; 502; 368; 37; 30 MG/100ML; MG/100ML; MG/100ML; MG/100ML; MG/100ML
1500 INJECTION, SOLUTION INTRAVENOUS ONCE
Status: COMPLETED | OUTPATIENT
Start: 2023-11-20 | End: 2023-11-20

## 2023-11-20 RX ORDER — KETOROLAC TROMETHAMINE 30 MG/ML
INJECTION, SOLUTION INTRAMUSCULAR; INTRAVENOUS PRN
Status: DISCONTINUED | OUTPATIENT
Start: 2023-11-20 | End: 2023-11-20 | Stop reason: HOSPADM

## 2023-11-20 RX ORDER — IBUPROFEN 800 MG/1
800 TABLET ORAL EVERY 8 HOURS PRN
Status: DISCONTINUED | OUTPATIENT
Start: 2023-11-24 | End: 2023-11-21 | Stop reason: HOSPADM

## 2023-11-20 RX ORDER — CITRIC ACID/SODIUM CITRATE 334-500MG
30 SOLUTION, ORAL ORAL ONCE
Status: COMPLETED | OUTPATIENT
Start: 2023-11-20 | End: 2023-11-20

## 2023-11-20 RX ORDER — CALCIUM CARBONATE 500 MG/1
1000 TABLET, CHEWABLE ORAL EVERY 6 HOURS PRN
Status: DISCONTINUED | OUTPATIENT
Start: 2023-11-20 | End: 2023-11-21 | Stop reason: HOSPADM

## 2023-11-20 RX ORDER — BISACODYL 10 MG
10 SUPPOSITORY, RECTAL RECTAL PRN
Status: DISCONTINUED | OUTPATIENT
Start: 2023-11-20 | End: 2023-11-21 | Stop reason: HOSPADM

## 2023-11-20 RX ORDER — EPHEDRINE SULFATE 50 MG/ML
10 INJECTION, SOLUTION INTRAVENOUS
Status: ACTIVE | OUTPATIENT
Start: 2023-11-20 | End: 2023-11-21

## 2023-11-20 RX ORDER — MEPERIDINE HYDROCHLORIDE 25 MG/ML
12.5 INJECTION INTRAMUSCULAR; INTRAVENOUS; SUBCUTANEOUS
Status: DISCONTINUED | OUTPATIENT
Start: 2023-11-20 | End: 2023-11-20 | Stop reason: HOSPADM

## 2023-11-20 RX ORDER — IBUPROFEN 800 MG/1
800 TABLET ORAL EVERY 8 HOURS
Status: DISCONTINUED | OUTPATIENT
Start: 2023-11-21 | End: 2023-11-21 | Stop reason: HOSPADM

## 2023-11-20 RX ORDER — HYDROMORPHONE HYDROCHLORIDE 1 MG/ML
0.4 INJECTION, SOLUTION INTRAMUSCULAR; INTRAVENOUS; SUBCUTANEOUS
Status: DISCONTINUED | OUTPATIENT
Start: 2023-11-20 | End: 2023-11-20 | Stop reason: HOSPADM

## 2023-11-20 RX ORDER — SODIUM CHLORIDE, SODIUM LACTATE, POTASSIUM CHLORIDE, CALCIUM CHLORIDE 600; 310; 30; 20 MG/100ML; MG/100ML; MG/100ML; MG/100ML
INJECTION, SOLUTION INTRAVENOUS PRN
Status: DISCONTINUED | OUTPATIENT
Start: 2023-11-20 | End: 2023-11-21 | Stop reason: HOSPADM

## 2023-11-20 RX ORDER — OXYCODONE HYDROCHLORIDE 10 MG/1
10 TABLET ORAL EVERY 4 HOURS PRN
Status: ACTIVE | OUTPATIENT
Start: 2023-11-20 | End: 2023-11-21

## 2023-11-20 RX ORDER — OXYTOCIN 10 [USP'U]/ML
INJECTION, SOLUTION INTRAMUSCULAR; INTRAVENOUS PRN
Status: DISCONTINUED | OUTPATIENT
Start: 2023-11-20 | End: 2023-11-20 | Stop reason: SURG

## 2023-11-20 RX ORDER — OXYCODONE HCL 5 MG/5 ML
10 SOLUTION, ORAL ORAL
Status: COMPLETED | OUTPATIENT
Start: 2023-11-20 | End: 2023-11-20

## 2023-11-20 RX ORDER — ONDANSETRON 2 MG/ML
4 INJECTION INTRAMUSCULAR; INTRAVENOUS EVERY 6 HOURS PRN
Status: DISCONTINUED | OUTPATIENT
Start: 2023-11-21 | End: 2023-11-21 | Stop reason: HOSPADM

## 2023-11-20 RX ORDER — DIPHENHYDRAMINE HYDROCHLORIDE 50 MG/ML
25 INJECTION INTRAMUSCULAR; INTRAVENOUS EVERY 6 HOURS PRN
Status: ACTIVE | OUTPATIENT
Start: 2023-11-20 | End: 2023-11-21

## 2023-11-20 RX ORDER — HALOPERIDOL 5 MG/ML
1 INJECTION INTRAMUSCULAR
Status: DISCONTINUED | OUTPATIENT
Start: 2023-11-20 | End: 2023-11-20 | Stop reason: HOSPADM

## 2023-11-20 RX ORDER — HYDRALAZINE HYDROCHLORIDE 20 MG/ML
5 INJECTION INTRAMUSCULAR; INTRAVENOUS
Status: DISCONTINUED | OUTPATIENT
Start: 2023-11-20 | End: 2023-11-20 | Stop reason: HOSPADM

## 2023-11-20 RX ORDER — CEFAZOLIN SODIUM 1 G/3ML
2 INJECTION, POWDER, FOR SOLUTION INTRAMUSCULAR; INTRAVENOUS ONCE
Status: COMPLETED | OUTPATIENT
Start: 2023-11-20 | End: 2023-11-20

## 2023-11-20 RX ORDER — HYDROMORPHONE HYDROCHLORIDE 1 MG/ML
0.2 INJECTION, SOLUTION INTRAMUSCULAR; INTRAVENOUS; SUBCUTANEOUS
Status: DISCONTINUED | OUTPATIENT
Start: 2023-11-20 | End: 2023-11-20 | Stop reason: HOSPADM

## 2023-11-20 RX ORDER — ONDANSETRON 2 MG/ML
4 INJECTION INTRAMUSCULAR; INTRAVENOUS
Status: DISCONTINUED | OUTPATIENT
Start: 2023-11-20 | End: 2023-11-20 | Stop reason: HOSPADM

## 2023-11-20 RX ORDER — OXYCODONE HYDROCHLORIDE 5 MG/1
5 TABLET ORAL EVERY 4 HOURS PRN
Status: ACTIVE | OUTPATIENT
Start: 2023-11-20 | End: 2023-11-21

## 2023-11-20 RX ORDER — KETOROLAC TROMETHAMINE 30 MG/ML
30 INJECTION, SOLUTION INTRAMUSCULAR; INTRAVENOUS EVERY 6 HOURS
Status: COMPLETED | OUTPATIENT
Start: 2023-11-20 | End: 2023-11-21

## 2023-11-20 RX ORDER — DOCUSATE SODIUM 100 MG/1
100 CAPSULE, LIQUID FILLED ORAL 2 TIMES DAILY PRN
Status: DISCONTINUED | OUTPATIENT
Start: 2023-11-20 | End: 2023-11-21 | Stop reason: HOSPADM

## 2023-11-20 RX ORDER — METOCLOPRAMIDE HYDROCHLORIDE 5 MG/ML
10 INJECTION INTRAMUSCULAR; INTRAVENOUS ONCE
Status: COMPLETED | OUTPATIENT
Start: 2023-11-20 | End: 2023-11-20

## 2023-11-20 RX ORDER — DEXAMETHASONE SODIUM PHOSPHATE 4 MG/ML
INJECTION, SOLUTION INTRA-ARTICULAR; INTRALESIONAL; INTRAMUSCULAR; INTRAVENOUS; SOFT TISSUE PRN
Status: DISCONTINUED | OUTPATIENT
Start: 2023-11-20 | End: 2023-11-20 | Stop reason: SURG

## 2023-11-20 RX ORDER — SIMETHICONE 125 MG
125 TABLET,CHEWABLE ORAL 4 TIMES DAILY PRN
Status: DISCONTINUED | OUTPATIENT
Start: 2023-11-20 | End: 2023-11-21 | Stop reason: HOSPADM

## 2023-11-20 RX ORDER — HYDROMORPHONE HYDROCHLORIDE 1 MG/ML
0.2 INJECTION, SOLUTION INTRAMUSCULAR; INTRAVENOUS; SUBCUTANEOUS
Status: ACTIVE | OUTPATIENT
Start: 2023-11-20 | End: 2023-11-21

## 2023-11-20 RX ORDER — ONDANSETRON 2 MG/ML
INJECTION INTRAMUSCULAR; INTRAVENOUS PRN
Status: DISCONTINUED | OUTPATIENT
Start: 2023-11-20 | End: 2023-11-20 | Stop reason: SURG

## 2023-11-20 RX ORDER — BUPIVACAINE HYDROCHLORIDE 7.5 MG/ML
INJECTION, SOLUTION INTRASPINAL
Status: COMPLETED | OUTPATIENT
Start: 2023-11-20 | End: 2023-11-20

## 2023-11-20 RX ORDER — IPRATROPIUM BROMIDE AND ALBUTEROL SULFATE 2.5; .5 MG/3ML; MG/3ML
3 SOLUTION RESPIRATORY (INHALATION)
Status: DISCONTINUED | OUTPATIENT
Start: 2023-11-20 | End: 2023-11-20 | Stop reason: HOSPADM

## 2023-11-20 RX ORDER — ONDANSETRON 2 MG/ML
4 INJECTION INTRAMUSCULAR; INTRAVENOUS EVERY 6 HOURS PRN
Status: ACTIVE | OUTPATIENT
Start: 2023-11-20 | End: 2023-11-21

## 2023-11-20 RX ORDER — OXYCODONE HCL 5 MG/5 ML
5 SOLUTION, ORAL ORAL
Status: COMPLETED | OUTPATIENT
Start: 2023-11-20 | End: 2023-11-20

## 2023-11-20 RX ORDER — MIDAZOLAM HYDROCHLORIDE 1 MG/ML
1 INJECTION INTRAMUSCULAR; INTRAVENOUS
Status: DISCONTINUED | OUTPATIENT
Start: 2023-11-20 | End: 2023-11-20 | Stop reason: HOSPADM

## 2023-11-20 RX ORDER — ONDANSETRON 4 MG/1
4 TABLET, ORALLY DISINTEGRATING ORAL EVERY 6 HOURS PRN
Status: DISCONTINUED | OUTPATIENT
Start: 2023-11-21 | End: 2023-11-21 | Stop reason: HOSPADM

## 2023-11-20 RX ORDER — PHENYLEPHRINE HCL IN 0.9% NACL 0.5 MG/5ML
SYRINGE (ML) INTRAVENOUS PRN
Status: DISCONTINUED | OUTPATIENT
Start: 2023-11-20 | End: 2023-11-20 | Stop reason: SURG

## 2023-11-20 RX ORDER — ACETAMINOPHEN 500 MG
1000 TABLET ORAL EVERY 6 HOURS PRN
Status: DISCONTINUED | OUTPATIENT
Start: 2023-11-24 | End: 2023-11-21 | Stop reason: HOSPADM

## 2023-11-20 RX ORDER — OXYCODONE HYDROCHLORIDE 10 MG/1
10 TABLET ORAL EVERY 4 HOURS PRN
Status: DISCONTINUED | OUTPATIENT
Start: 2023-11-21 | End: 2023-11-21 | Stop reason: HOSPADM

## 2023-11-20 RX ORDER — HYDROMORPHONE HYDROCHLORIDE 1 MG/ML
0.1 INJECTION, SOLUTION INTRAMUSCULAR; INTRAVENOUS; SUBCUTANEOUS
Status: DISCONTINUED | OUTPATIENT
Start: 2023-11-20 | End: 2023-11-20 | Stop reason: HOSPADM

## 2023-11-20 RX ORDER — DIPHENHYDRAMINE HCL 25 MG
25 TABLET ORAL EVERY 6 HOURS PRN
Status: DISCONTINUED | OUTPATIENT
Start: 2023-11-21 | End: 2023-11-21 | Stop reason: HOSPADM

## 2023-11-20 RX ORDER — DIPHENHYDRAMINE HYDROCHLORIDE 50 MG/ML
12.5 INJECTION INTRAMUSCULAR; INTRAVENOUS EVERY 6 HOURS PRN
Status: ACTIVE | OUTPATIENT
Start: 2023-11-20 | End: 2023-11-21

## 2023-11-20 RX ORDER — VITAMIN A ACETATE, BETA CAROTENE, ASCORBIC ACID, CHOLECALCIFEROL, .ALPHA.-TOCOPHEROL ACETATE, DL-, THIAMINE MONONITRATE, RIBOFLAVIN, NIACINAMIDE, PYRIDOXINE HYDROCHLORIDE, FOLIC ACID, CYANOCOBALAMIN, CALCIUM CARBONATE, FERROUS FUMARATE, ZINC OXIDE, CUPRIC OXIDE 3080; 12; 120; 400; 1; 1.84; 3; 20; 22; 920; 25; 200; 27; 10; 2 [IU]/1; UG/1; MG/1; [IU]/1; MG/1; MG/1; MG/1; MG/1; MG/1; [IU]/1; MG/1; MG/1; MG/1; MG/1; MG/1
1 TABLET, FILM COATED ORAL
Status: DISCONTINUED | OUTPATIENT
Start: 2023-11-21 | End: 2023-11-21 | Stop reason: HOSPADM

## 2023-11-20 RX ORDER — ACETAMINOPHEN 500 MG
1000 TABLET ORAL EVERY 8 HOURS
Status: COMPLETED | OUTPATIENT
Start: 2023-11-20 | End: 2023-11-21

## 2023-11-20 RX ORDER — SODIUM CHLORIDE, SODIUM LACTATE, POTASSIUM CHLORIDE, CALCIUM CHLORIDE 600; 310; 30; 20 MG/100ML; MG/100ML; MG/100ML; MG/100ML
INJECTION, SOLUTION INTRAVENOUS ONCE
Status: ACTIVE | OUTPATIENT
Start: 2023-11-20 | End: 2023-11-21

## 2023-11-20 RX ORDER — LABETALOL HYDROCHLORIDE 5 MG/ML
5 INJECTION, SOLUTION INTRAVENOUS
Status: DISCONTINUED | OUTPATIENT
Start: 2023-11-20 | End: 2023-11-20 | Stop reason: HOSPADM

## 2023-11-20 RX ORDER — DIPHENHYDRAMINE HYDROCHLORIDE 50 MG/ML
25 INJECTION INTRAMUSCULAR; INTRAVENOUS EVERY 6 HOURS PRN
Status: DISCONTINUED | OUTPATIENT
Start: 2023-11-21 | End: 2023-11-21 | Stop reason: HOSPADM

## 2023-11-20 RX ADMIN — MORPHINE SULFATE 150 MCG: 0.5 INJECTION, SOLUTION EPIDURAL; INTRATHECAL; INTRAVENOUS at 09:46

## 2023-11-20 RX ADMIN — BUPIVACAINE HYDROCHLORIDE IN DEXTROSE 1.7 ML: 7.5 INJECTION, SOLUTION SUBARACHNOID at 09:46

## 2023-11-20 RX ADMIN — Medication 100 MCG: at 09:47

## 2023-11-20 RX ADMIN — ACETAMINOPHEN 1000 MG: 500 TABLET, FILM COATED ORAL at 22:32

## 2023-11-20 RX ADMIN — FAMOTIDINE 20 MG: 10 INJECTION, SOLUTION INTRAVENOUS at 09:30

## 2023-11-20 RX ADMIN — Medication 100 MCG: at 09:52

## 2023-11-20 RX ADMIN — KETOROLAC TROMETHAMINE 30 MG: 30 INJECTION, SOLUTION INTRAMUSCULAR; INTRAVENOUS at 16:42

## 2023-11-20 RX ADMIN — SODIUM CHLORIDE, SODIUM GLUCONATE, SODIUM ACETATE, POTASSIUM CHLORIDE AND MAGNESIUM CHLORIDE: 526; 502; 368; 37; 30 INJECTION, SOLUTION INTRAVENOUS at 10:19

## 2023-11-20 RX ADMIN — SODIUM CHLORIDE, SODIUM GLUCONATE, SODIUM ACETATE, POTASSIUM CHLORIDE AND MAGNESIUM CHLORIDE: 526; 502; 368; 37; 30 INJECTION, SOLUTION INTRAVENOUS at 09:41

## 2023-11-20 RX ADMIN — PHENYLEPHRINE HYDROCHLORIDE 200 MCG: 10 INJECTION INTRAVENOUS at 09:57

## 2023-11-20 RX ADMIN — PHENYLEPHRINE HYDROCHLORIDE 100 MCG: 10 INJECTION INTRAVENOUS at 09:46

## 2023-11-20 RX ADMIN — PHENYLEPHRINE HYDROCHLORIDE 200 MCG: 10 INJECTION INTRAVENOUS at 09:54

## 2023-11-20 RX ADMIN — METOCLOPRAMIDE 10 MG: 5 INJECTION, SOLUTION INTRAMUSCULAR; INTRAVENOUS at 09:29

## 2023-11-20 RX ADMIN — AZITHROMYCIN FOR INJECTION INJECTION, POWDER, LYOPHILIZED, FOR SOLUTION 500 MG: 500 INJECTION INTRAVENOUS at 09:00

## 2023-11-20 RX ADMIN — CEFAZOLIN 2 G: 1 INJECTION, POWDER, FOR SOLUTION INTRAMUSCULAR; INTRAVENOUS at 09:47

## 2023-11-20 RX ADMIN — ACETAMINOPHEN 1000 MG: 500 TABLET, FILM COATED ORAL at 14:10

## 2023-11-20 RX ADMIN — KETOROLAC TROMETHAMINE 30 MG: 30 INJECTION, SOLUTION INTRAMUSCULAR; INTRAVENOUS at 22:31

## 2023-11-20 RX ADMIN — OXYTOCIN 125 ML/HR: 10 INJECTION, SOLUTION INTRAMUSCULAR; INTRAVENOUS at 08:45

## 2023-11-20 RX ADMIN — SODIUM CITRATE AND CITRIC ACID MONOHYDRATE 30 ML: 500; 334 SOLUTION ORAL at 09:29

## 2023-11-20 RX ADMIN — DEXAMETHASONE SODIUM PHOSPHATE 8 MG: 4 INJECTION INTRA-ARTICULAR; INTRALESIONAL; INTRAMUSCULAR; INTRAVENOUS; SOFT TISSUE at 09:47

## 2023-11-20 RX ADMIN — ONDANSETRON 4 MG: 2 INJECTION INTRAMUSCULAR; INTRAVENOUS at 09:47

## 2023-11-20 RX ADMIN — DOCUSATE SODIUM 100 MG: 100 CAPSULE, LIQUID FILLED ORAL at 19:55

## 2023-11-20 RX ADMIN — OXYCODONE HYDROCHLORIDE 5 MG: 5 SOLUTION ORAL at 12:31

## 2023-11-20 RX ADMIN — OXYTOCIN 20 UNITS: 10 INJECTION, SOLUTION INTRAMUSCULAR; INTRAVENOUS at 10:18

## 2023-11-20 RX ADMIN — PHENYLEPHRINE HYDROCHLORIDE 100 MCG: 10 INJECTION INTRAVENOUS at 09:50

## 2023-11-20 RX ADMIN — KETOROLAC TROMETHAMINE 30 MG: 30 INJECTION, SOLUTION INTRAMUSCULAR; INTRAVENOUS at 10:53

## 2023-11-20 RX ADMIN — Medication 200 MCG: at 09:55

## 2023-11-20 ASSESSMENT — COPD QUESTIONNAIRES
DURING THE PAST 4 WEEKS HOW MUCH DID YOU FEEL SHORT OF BREATH: NONE/LITTLE OF THE TIME
IN THE PAST 12 MONTHS DO YOU DO LESS THAN YOU USED TO BECAUSE OF YOUR BREATHING PROBLEMS: DISAGREE/UNSURE
HAVE YOU SMOKED AT LEAST 100 CIGARETTES IN YOUR ENTIRE LIFE: NO/DON'T KNOW
DO YOU EVER COUGH UP ANY MUCUS OR PHLEGM?: NO/ONLY WITH OCCASIONAL COLDS OR INFECTIONS

## 2023-11-20 ASSESSMENT — PAIN SCALES - GENERAL
PAIN_LEVEL: 0
PAINLEVEL: 0 - NO PAIN

## 2023-11-20 ASSESSMENT — LIFESTYLE VARIABLES
TOTAL SCORE: 0
EVER FELT BAD OR GUILTY ABOUT YOUR DRINKING: NO
EVER_SMOKED: NEVER
TOTAL SCORE: 0
HAVE PEOPLE ANNOYED YOU BY CRITICIZING YOUR DRINKING: NO
HAVE YOU EVER FELT YOU SHOULD CUT DOWN ON YOUR DRINKING: NO
TOTAL SCORE: 0
EVER HAD A DRINK FIRST THING IN THE MORNING TO STEADY YOUR NERVES TO GET RID OF A HANGOVER: NO
ALCOHOL_USE: NO
DOES PATIENT WANT TO STOP DRINKING: NO
CONSUMPTION TOTAL: INCOMPLETE

## 2023-11-20 ASSESSMENT — PATIENT HEALTH QUESTIONNAIRE - PHQ9
1. LITTLE INTEREST OR PLEASURE IN DOING THINGS: NOT AT ALL
2. FEELING DOWN, DEPRESSED, IRRITABLE, OR HOPELESS: NOT AT ALL
SUM OF ALL RESPONSES TO PHQ9 QUESTIONS 1 AND 2: 0

## 2023-11-20 ASSESSMENT — PAIN DESCRIPTION - PAIN TYPE
TYPE: SURGICAL PAIN

## 2023-11-20 ASSESSMENT — FIBROSIS 4 INDEX: FIB4 SCORE: 1.03

## 2023-11-20 NOTE — ANESTHESIA PREPROCEDURE EVALUATION
Case: [id number] Date/Time: 23    Procedure: REPEAT  SECTION WITH BILATERAL SALPINGECTOMY    Pre-op diagnosis: prev c section, perm sterilization, 39W    Location: [facility]  / [facility]    Surgeons: Vicki Lucero D.O.            Relevant Problems   No relevant active problems    with planned  section.     Epidural with first, describes 'patchy' coverage.     Seizure: per patient likely syncopal event not seizure.     NPO.     Physical Exam    Airway   Mallampati: II  TM distance: >3 FB  Neck ROM: full       Cardiovascular - normal exam  Rhythm: regular  Rate: normal  (-) murmur     Dental - normal exam           Pulmonary - normal exam  Breath sounds clear to auscultation     Abdominal    Neurological - normal exam                   Anesthesia Plan    ASA 2       Plan - spinal   Neuraxial block will be primary anesthetic                Postoperative Plan: Postoperative administration of opioids is intended.    Pertinent diagnostic labs and testing reviewed    Informed Consent:    Anesthetic plan and risks discussed with patient.

## 2023-11-20 NOTE — ANESTHESIA TIME REPORT
Anesthesia Start and Stop Event Times       Date Time Event    11/20/2023 0914 Ready for Procedure     0941 Anesthesia Start     1103 Anesthesia Stop          Responsible Staff  11/20/23      Name Role Begin End    Deion Trivedi M.D. Anesth 0941 [id number]          Overtime Reason:  no overtime (within assigned shift)    Comments:

## 2023-11-20 NOTE — ANESTHESIA PROCEDURE NOTES
Spinal Block    Date/Time: 11/20/2023 9:46 AM    Performed by: Deion Trivedi M.D.  Authorized by: Deion Trivedi M.D.    Start Time:  11/20/2023 9:46 AM  End Time:  11/20/2023 9:47 AM  Reason for Block: primary anesthetic    patient identified, IV checked, site marked, risks and benefits discussed, surgical consent, monitors and equipment checked, pre-op evaluation and timeout performed    Patient Position:  Sitting  Prep: ChloraPrep, patient draped and sterile technique    Monitoring:  Blood pressure, continuous pulse oximetry and heart rate  Approach:  Midline  Location:  L4-5  Injection Technique:  Single-shot  Skin infiltration:  Lidocaine  Strength:  1%  Dose:  3ml  Needle Type:  Pencan  Needle Gauge:  25 G  CSF flowing pre/post injection:  Yes  Sensory Level:  T6

## 2023-11-20 NOTE — PROGRESS NOTES
0745: Pt ambulated to LD5. Orientated to room. EFM and TOCO applied. All questions and concerns answered at this time.    1016: Delivery of viable male infant.    1310: Half dollar size amount of blood on mepalex dressing at incision site. Mepalex dressing changed and reinforced with pressure dressing at bedside.     1320: Patient transported to postpartum unit via bed with infant in arms. Bands verified. VSS. Bedside report given to Magda MUNGUIA.

## 2023-11-20 NOTE — H&P
History and Physical      Khris Mays is a 28 y.o. female  at 39w0d who presents for scheduled RCS with bilateral salpingectomy.     Subjective:   negative  For CTXS.   negative Feels pain   No LOF  negative for vaginal bleeding.   positive for fetal movement    ROS: Pertinent items are noted in HPI.    Past Medical History:   Diagnosis Date    Arrhythmia     Skip a beat    GERD (gastroesophageal reflux disease)     Heart murmur     Migraine     Pregnant     Seizure (HCC)     Last seizure ?     Past Surgical History:   Procedure Laterality Date    ABDOMINAL EXPLORATION      ADENOIDECTOMY      APPENDECTOMY      OTHER      Tonsils, adenoids, appendix,    PRIMARY C SECTION       OB History    Para Term  AB Living   2 1 1     1   SAB IAB Ectopic Molar Multiple Live Births             1      # Outcome Date GA Lbr Justin/2nd Weight Sex Delivery Anes PTL Lv   2 Current            1 Term 18 40w0d  4.167 kg (9 lb 3 oz) F CS-Unspec Spinal N SNEHA      Birth Comments: Primary C/S due to FTP only dilated to 9 cm, PIH, pt states her incision opened up from inside out had to pack for 6 weeks.     Social History     Socioeconomic History    Marital status: Legally      Spouse name: Not on file    Number of children: Not on file    Years of education: Not on file    Highest education level: Not on file   Occupational History    Not on file   Tobacco Use    Smoking status: Never    Smokeless tobacco: Never   Vaping Use    Vaping Use: Never used   Substance and Sexual Activity    Alcohol use: Never    Drug use: Never    Sexual activity: Yes     Partners: Male     Comment: none   Other Topics Concern    Not on file   Social History Narrative    Not on file     Social Determinants of Health     Financial Resource Strain: Not on file   Food Insecurity: Not on file   Transportation Needs: Not on file   Physical Activity: Not on file   Stress: Not on file   Social Connections: Not on  "file   Intimate Partner Violence: Not on file   Housing Stability: Not on file     Allergies: Influenza vaccines, Penicillins, and Tetanus-diphth-acell pertussis    Current Facility-Administered Medications:     lactated ringers (LR) infusion, , Intravenous, Continuous, [email or web address] [email or web address]    ceFAZolin (Ancef) injection 2 g, 2 g, Intravenous, Once, Vicki Lucero D.O.    LR infusion, , Intravenous, Once, NILESH [email or web address]    oxytocin (Pitocin) infusion bolus (for post delivery), 20 Units, Intravenous, Once **FOLLOWED BY** oxytocin (Pitocin) infusion (for post delivery), 125 mL/hr, Intravenous, Continuous, Vicki Lucero D.O.    oxytocin (Pitocin) injection 10 Units, 10 Units, Intramuscular, Once PRN, Vicki Lucero D.O.    electrolyte-A (Plasmalyte-A) infusion 1,500 mL, 1,500 mL, Intravenous, Once, Deion Trivedi M.D.    Na citrate-citric acid (Bicitra) 500-334 MG/5ML solution 30 mL, 30 mL, Oral, Once, Deion Trivedi M.D.    famotidine (Pepcid) injection 20 mg, 20 mg, Intravenous, Once, Deion Trivedi M.D.    metoclopramide (Reglan) injection 10 mg, 10 mg, Intravenous, Once, Deion Trivedi M.D.    azithromycin (ZITHROMAX) 500 mg in D5W 250 mL IVPB premix, 500 mg, Intravenous, Once, NILESH [email or web address]    [facility]atal care with [facility] starting at 17 with following problems:  Patient Active Problem List    Diagnosis Date Noted    Labor and delivery, indication for care 11/20/2023    Thrombocytopenia (HCC) 10/23/2023    Hx of preeclampsia, prior pregnancy - C/S at 40wk, ASA started 06/15/2023    History of pericarditis - cardiology appt wnl, no f/u or recommendations needed 06/15/2023    History of C/S x 1 for FTP - desires repeat, unsure BTL 06/15/2023    Multiple allergies - \"all vaccines\" and PCN, tape 06/15/2023    History of macrosomia in infant in prior pregnancy - 9lb3oz 06/15/2023               Objective:      /85   Pulse 86   Temp 36.5 °C (97.7 °F) (Temporal)   Resp 18   Ht 1.626 " "m (5' 4\")   Wt 80.3 kg (177 lb 0.5 oz)   SpO2 97%     General:   no acute distress, alert, cooperative   Skin:   normal   HEENT:  PERRLA and EOMI   Lungs:   CTA bilateral   Heart:   regular rate and rhythm, no pedal edema   Abdomen:   gravid, NT   EFW:  4000 gm   Pelvis:  adequate with gynecoid pelvis   FHT:  Category I   Uterine Size: S=D   Presentations: Unsure   Cervix:                               Lab Review  Lab:   Blood type: O     Recent Results (from the past 5880 hour(s))   POCT Pregnancy    Collection Time: 06/15/23  9:19 AM   Result Value Ref Range    POC Urine Pregnancy Test Positive     Internal Control Positive Positive     Internal Control Negative Negative    THINPREP RFLX HPV ASCUS W/CTNG    Collection Time: 06/23/23  1:42 PM   Result Value Ref Range    Cytology Reg See Path Report     C. trachomatis by PCR Negative Negative    N. gonorrhoeae by PCR Negative Negative    Source Cervical    GLUCOSE 1HR GESTATIONAL    Collection Time: 07/13/23  9:54 AM   Result Value Ref Range    Glucose, Post Dose 153 (H) 70 - 139 mg/dL   PROTEIN/CREAT RATIO URINE    Collection Time: 07/13/23  9:54 AM   Result Value Ref Range    Total Protein, Urine 4.0 0.0 - 15.0 mg/dL    Creatinine, Random Urine 41.37 mg/dL    Protein Creatinine Ratio 97 10 - 107 mg/g   Comp Metabolic Panel    Collection Time: 07/13/23  9:54 AM   Result Value Ref Range    Sodium 137 135 - 145 mmol/L    Potassium 3.9 3.6 - 5.5 mmol/L    Chloride 103 96 - 112 mmol/L    Co2 21 20 - 33 mmol/L    Anion Gap 13.0 7.0 - 16.0    Glucose 151 (H) 65 - 99 mg/dL    Bun 6 (L) 8 - 22 mg/dL    Creatinine 0.48 (L) 0.50 - 1.40 mg/dL    Calcium 8.9 8.5 - 10.5 mg/dL    AST(SGOT) 22 12 - 45 U/L    ALT(SGPT) 12 2 - 50 U/L    Alkaline Phosphatase 70 30 - 99 U/L    Total Bilirubin 0.5 0.1 - 1.5 mg/dL    Albumin 3.9 3.2 - 4.9 g/dL    Total Protein 6.4 6.0 - 8.2 g/dL    Globulin 2.5 1.9 - 3.5 g/dL    A-G Ratio 1.6 g/dL   URINE DRUG SCREEN W/CONF (AR)    Collection Time: " 07/13/23  9:54 AM   Result Value Ref Range    Urine Amphetamine-Methamphetam Negative Cutoff 300 ng/mL    Barbiturates Negative Cutoff 200 ng/mL    Benzodiazepines Negative Cutoff 200 ng/mL    Propoxyphene Negative Cutoff 300 ng/mL    Cocaine Metabolite Negative Cutoff 150 ng/mL    Methadone Negative Cutoff 150 ng/mL    Codeine-Morphine Negative Cutoff 300 ng/mL    Phencyclidine -Pcp Negative Cutoff 25 ng/mL    Cannabinoid Metab Negative Cutoff 50 ng/mL    Drug Comment Urine Drugs See Note    PREG CNTR PRENATAL PN    Collection Time: 07/13/23  9:54 AM   Result Value Ref Range    WBC 7.9 4.8 - 10.8 K/uL    RBC 4.14 (L) 4.20 - 5.40 M/uL    Hemoglobin 13.1 12.0 - 16.0 g/dL    Hematocrit 39.4 37.0 - 47.0 %    MCV 95.2 81.4 - 97.8 fL    MCH 31.6 27.0 - 33.0 pg    MCHC 33.2 32.2 - 35.5 g/dL    RDW 48.5 35.9 - 50.0 fL    Platelet Count 173 164 - 446 K/uL    MPV 11.5 9.0 - 12.9 fL    Hepatitis C Antibody Non-Reactive Non-Reactive    Hepatitis B Surface Antigen Non-Reactive Non-Reactive    Rubella IgG Antibody 221.00 IU/mL    Syphilis, Treponemal Qual Non-Reactive Non-Reactive   URINE CULTURE(NEW)    Collection Time: 07/13/23  9:54 AM    Specimen: Urine   Result Value Ref Range    Significant Indicator NEG     Source UR     Site Clean Catch     Culture Result Usual urogenital jesse 10-50,000 cfu/mL    HIV AG/AB COMBO ASSAY SCREENING    Collection Time: 07/13/23  9:54 AM   Result Value Ref Range    HIV Ag/Ab Combo Assay Non-Reactive Non Reactive   OP Prenatal Panel-Blood Bank    Collection Time: 07/13/23  9:54 AM   Result Value Ref Range    ABO Grouping Only O     Rh Grouping Only POS     Antibody Screen Scrn NEG    CORRECTED CALCIUM    Collection Time: 07/13/23  9:54 AM   Result Value Ref Range    Correct Calcium 9.0 8.5 - 10.5 mg/dL   ESTIMATED GFR    Collection Time: 07/13/23  9:54 AM   Result Value Ref Range    GFR (CKD-EPI) 132 >60 mL/min/1.73 m 2   GRP B STREP, BY PCR (LUIS BROTH)    Collection Time: 10/31/23  9:07 AM     "Specimen: Genital   Result Value Ref Range    Strep Gp B DNA PCR Negative Negative   Hold Blood Bank Specimen (Not Tested)    Collection Time: 11/20/23  8:27 AM   Result Value Ref Range    Holding Tube - Bb DONE    CBC with Differential    Collection Time: 11/20/23  8:27 AM   Result Value Ref Range    WBC 9.9 4.8 - 10.8 K/uL    RBC 4.95 4.20 - 5.40 M/uL    Hemoglobin 15.6 12.0 - 16.0 g/dL    Hematocrit 45.3 37.0 - 47.0 %    MCV 91.5 81.4 - 97.8 fL    MCH 31.5 27.0 - 33.0 pg    MCHC 34.4 32.2 - 35.5 g/dL    RDW 46.1 35.9 - 50.0 fL    Platelet Count 114 (L) 164 - 446 K/uL    MPV 12.4 9.0 - 12.9 fL    Neutrophils-Polys 74.50 (H) 44.00 - 72.00 %    Lymphocytes 17.70 (L) 22.00 - 41.00 %    Monocytes 4.30 0.00 - 13.40 %    Eosinophils 0.90 0.00 - 6.90 %    Basophils 0.30 0.00 - 1.80 %    Immature Granulocytes 2.30 (H) 0.00 - 0.90 %    Nucleated RBC 0.00 0.00 - 0.20 /100 WBC    Neutrophils (Absolute) 7.35 1.82 - 7.42 K/uL    Lymphs (Absolute) 1.75 1.00 - 4.80 K/uL    Monos (Absolute) 0.42 0.00 - 0.85 K/uL    Eos (Absolute) 0.09 0.00 - 0.51 K/uL    Baso (Absolute) 0.03 0.00 - 0.12 K/uL    Immature Granulocytes (abs) 0.23 (H) 0.00 - 0.11 K/uL    NRBC (Absolute) 0.00 K/uL        Assessment:   Khris Mays at 39w0d  Labor status: Not in labor.  Obstetrical history significant for   Patient Active Problem List    Diagnosis Date Noted    Labor and delivery, indication for care 11/20/2023    Thrombocytopenia (HCC) 10/23/2023    Hx of preeclampsia, prior pregnancy - C/S at 40wk, ASA started 06/15/2023    History of pericarditis - cardiology appt wnl, no f/u or recommendations needed 06/15/2023    History of C/S x 1 for FTP - desires repeat, unsure BTL 06/15/2023    Multiple allergies - \"all vaccines\" and PCN, tape 06/15/2023    History of macrosomia in infant in prior pregnancy - 9lb3oz 06/15/2023   .      Plan:     Admit to L&D  GBS negative  Desires RCS with BS. Consents signed. Discussed with the patient the benefits, " risks and alternative of  delivery. Risks include but are not limited to pain, infection, bleeding with possible need for transfusion, scarring, damage to surrounding structures.  Specifically organs that can be damaged are bowel, bladder, ureters, and nerves. I also discussed with the patient the risk of wound infection and wound breakdown. We discussed that these risks are greater in people that have diabetes or obesity. I also discussed the risk of emergency blood transfusion, rare risk of emergency hysterectomy or death.  Patient had the opportunity to ask questions. All questions were answered to the patient's satisfaction.  She reported understanding and signed consent.  Zithromax and Ancef for hx of post op wound dehiscence.      SMCM            SMCM

## 2023-11-20 NOTE — CARE PLAN
The patient is Stable - Low risk of patient condition declining or worsening    Shift Goals  Clinical Goals: Infection prevention  Patient Goals: Healthy mom, healthy patient  Family Goals: Support    Progress made toward(s) clinical / shift goals:      Problem: Knowledge Deficit - L&D  Goal: Patient and family/caregivers will demonstrate understanding of plan of care, disease process/condition, diagnostic tests and medications  Outcome: Progressing     Problem: Pain - Standard  Goal: Alleviation of pain or a reduction in pain to the patient’s comfort goal  Outcome: Progressing       Patient is not progressing towards the following goals:

## 2023-11-20 NOTE — OP REPORT
PreOp Diagnosis: 1. IUP at 39w0d 2. History of  section 3. Desire for sterilization 4. History of post op wound dehiscence following primary  section         PostOp Diagnosis: same        Procedure(s):  REPEAT  SECTION WITH BILATERAL SALPINGECTOMY     Surgeon(s):  Vicki Lucero D.O.     Anesthesiologist/Type of Anesthesia:  Anesthesiologist: Deion Trivedi M.D./* No anesthesia type entered *     Surgical Staff:  Circulator: Emilie Kent R.N.  Scrub Person: Iveth Allen  Respiratory Technician: Ana Griffin [facility]  L&D Baby  Nurse: Alicia A Damico, R.N.     Specimens removed if any:  none     Estimated Blood Loss: 700cc     Findings: NEFG, dense adhesions from previous  section noted through the subcuticular space as well as the midline of the rectus.  Uterus was densely adhered to the rectus fascia on the patient's right. However, there was mobility noted on the left.  Left fallopian tube appeared normal but mobilization of the uterus was limited and ovary was not visualized.  The right ovary and fallopian tube were not visualized due to dense adhesions.  Viable male  delivered in the vertex presentation with Apgars of 8 and 9 delivered at 1016 with a weight of 3550 gms. Due to dense adhesions, sterilization procedure could not be performed.     Complications: none        Condition: Mother and baby tolerated procedure well and was taken to the recovery room awake and in stable condition.    Procedure:     The patient was taken to the operating room where spinal anesthesia was found to be adequate.  She was then prepped and draped in the normal sterile fashion in the dorsal supine position with a leftward tilt.  A Pfannenstiel skin incision was then made with a scalpel and carried through to the underlying fascia.  The fascia was incised in the midline and the incision extended laterally with Alvarado scissors.  The superior aspect of the fascial  incision was then grasped with Katheryn clamps, elevated, and the underlying rectus muscles dissected off bluntly.  Attention was then turned to the inferior aspect of the fascia which in a similar fashion was grasped, tented up with [place] clamps, and the rectus muscles dissected off bluntly.  Rectus muscles were then  in the midline and the peritoneum identified tented up and entered bluntly.  The peritoneal incision was then extended superiorly and inferiorly with good visualization of the bladder. It was apparent at this time that the right side of the uterus was adherent to the right lateral abdominal wall/ rectus. Adhesions were carefully taken down with avoidance of bowel or bladder, but the majority of the adhesions were not directly visible and therefore could not safely be taken down. Bladder blade placed. Once adhesions were cleared enough to allow space for fetal delivery, the bladder reflection was then grasped with pickups and entered sharply with Metzenbaum scissors.  The incision was then extended and the bladder flap created digitally.  The lower uterine segment incised in a transverse fashion with the scalpel.  The uterine incision was then extended cephalad and caudad bluntly. Bladder blade removed. The infant was then delivered atraumatically.  The nose and mouth were suctioned with bulb suction the cord doubly clamped and cut.  The infant was handed off to the waiting RNs.  The placenta was then removed manually.  Bladder blade then replaced. The uterus was then cleared of all clots and debris.  The uterine incision was repaired with 0 Monocryl in a running locking fashion with a second imbricating layer for hemostasis. Hysterotomy was found to be hemostatic at this time. The gutters were cleared of all clots. Irrigation was performed. The fascia was reapproximated with 0 Vicryl in a running fashion. Irrigation was performed again. The subcuticular space was closed using a 3-0 Monocryl.   The skin was then closed using a 4-0 Monocryl.  The patient tolerated the procedure well.  Sponge lap and needle counts were correct x2.  2 g of Ancef and 500 mg Zithromax were given prior to the procedure.  The patient was taken to the recovery room awake and in stable condition.        [facility]

## 2023-11-20 NOTE — OR SURGEON
Immediate Post OP Note    PreOp Diagnosis: 1. IUP at 39w0d 2. History of  section 3. Desire for sterilization 4. History of post op wound dehiscence following primary  section       PostOp Diagnosis: same      Procedure(s):  REPEAT  SECTION WITH BILATERAL SALPINGECTOMY    Surgeon(s):  Vicki Lucero D.O.    Anesthesiologist/Type of Anesthesia:  Anesthesiologist: Deion Trivedi M.D./* No anesthesia type entered *    Surgical Staff:  Circulator: Emilie Kent R.N.  Scrub Person: Iveth Allen  Respiratory Technician: Ana Griffin [facility]  L&D Baby  Nurse: Alicia A Damico, R.N.    Specimens removed if any:  none    Estimated Blood Loss: 700cc    Findings: NEFG, dense adhesions from previous  section noted through the subcuticular space as well as the midline of the rectus.  Uterus was densely adhered to the rectus fascia on the patient's right. However, there was mobility noted on the left.  Left fallopian tube appeared normal but mobilization of the uterus was limited and ovary was not visualized.  The right ovary and fallopian tube were not visualized due to dense adhesions.  Viable male  delivered in the vertex presentation with Apgars of 8 and 9 delivered at 1016 with a weight of 3550 gms. Due to dense adhesions, sterilization procedure could not be performed.    Complications: none        2023 11:10 AM Vicki Lucero D.O.

## 2023-11-20 NOTE — ANESTHESIA POSTPROCEDURE EVALUATION
Patient: Khris Mays    Procedure Summary       Date: 23 Room / Location: [facility] OR 02 / SURGERY LABOR AND DELIVERY    Anesthesia Start: 941 Anesthesia Stop:     Procedure: REPEAT  SECTION WITH BILATERAL SALPINGECTOMY Diagnosis: (prev c section, perm sterilization, 39W)    Surgeons: Vicki Lucero D.O. Responsible Provider: Deion Trivedi M.D.    Anesthesia Type: spinal ASA Status: 2            Final Anesthesia Type: spinal  Last vitals  BP   Blood Pressure: 128/85    Temp   36.5 °C (97.7 °F)    Pulse   86   Resp   18    SpO2   97 %      Anesthesia Post Evaluation    Patient location during evaluation: PACU  Patient participation: complete - patient participated  Level of consciousness: awake and alert  Pain score: 0    Airway patency: patent  Anesthetic complications: no  Cardiovascular status: hemodynamically stable  Respiratory status: acceptable  Hydration status: euvolemic    PONV: none          No notable events documented.

## 2023-11-20 NOTE — PROGRESS NOTES
Received report from L&D RN. Orientated patient to room, call light and emergency light education provided. Assessment completed, fundus firm, lochia scant. Abdominal incision with new mepilex silver and abdominal pressure dressing intact clean and dry. Plan of care reviewed, patient verbalized understanding. Denies pain at this time, will call if pain med intervention is needed.     1645 - patient up and ambulated to the restroom with a wide steady gait. Pericare provided. Patient back to bed pulse ox applied.

## 2023-11-21 ENCOUNTER — PHARMACY VISIT (OUTPATIENT)
Dept: PHARMACY | Facility: MEDICAL CENTER | Age: 28
End: 2023-11-21
Payer: COMMERCIAL

## 2023-11-21 VITALS
BODY MASS INDEX: 30.22 KG/M2 | HEART RATE: 87 BPM | HEIGHT: 64 IN | DIASTOLIC BLOOD PRESSURE: 88 MMHG | TEMPERATURE: 98.2 F | SYSTOLIC BLOOD PRESSURE: 129 MMHG | OXYGEN SATURATION: 96 % | WEIGHT: 177.03 LBS | RESPIRATION RATE: 18 BRPM

## 2023-11-21 LAB
ERYTHROCYTE [DISTWIDTH] IN BLOOD BY AUTOMATED COUNT: 45.1 FL (ref 35.9–50)
ERYTHROCYTE [DISTWIDTH] IN BLOOD BY AUTOMATED COUNT: 45.9 FL (ref 35.9–50)
HCT VFR BLD AUTO: 33.7 % (ref 37–47)
HCT VFR BLD AUTO: 33.8 % (ref 37–47)
HGB BLD-MCNC: 11.6 G/DL (ref 12–16)
HGB BLD-MCNC: 11.7 G/DL (ref 12–16)
MCH RBC QN AUTO: 31.2 PG (ref 27–33)
MCH RBC QN AUTO: 31.7 PG (ref 27–33)
MCHC RBC AUTO-ENTMCNC: 34.3 G/DL (ref 32.2–35.5)
MCHC RBC AUTO-ENTMCNC: 34.7 G/DL (ref 32.2–35.5)
MCV RBC AUTO: 90.9 FL (ref 81.4–97.8)
MCV RBC AUTO: 91.3 FL (ref 81.4–97.8)
PLATELET # BLD AUTO: 105 K/UL (ref 164–446)
PLATELET # BLD AUTO: 126 K/UL (ref 164–446)
PMV BLD AUTO: 11.4 FL (ref 9–12.9)
PMV BLD AUTO: 12.3 FL (ref 9–12.9)
RBC # BLD AUTO: 3.69 M/UL (ref 4.2–5.4)
RBC # BLD AUTO: 3.72 M/UL (ref 4.2–5.4)
WBC # BLD AUTO: 10.9 K/UL (ref 4.8–10.8)
WBC # BLD AUTO: 13.2 K/UL (ref 4.8–10.8)

## 2023-11-21 PROCEDURE — 700102 HCHG RX REV CODE 250 W/ 637 OVERRIDE(OP): Performed by: ANESTHESIOLOGY

## 2023-11-21 PROCEDURE — A9270 NON-COVERED ITEM OR SERVICE: HCPCS | Performed by: STUDENT IN AN ORGANIZED HEALTH CARE EDUCATION/TRAINING PROGRAM

## 2023-11-21 PROCEDURE — RXMED WILLOW AMBULATORY MEDICATION CHARGE: Performed by: OBSTETRICS & GYNECOLOGY

## 2023-11-21 PROCEDURE — 85027 COMPLETE CBC AUTOMATED: CPT

## 2023-11-21 PROCEDURE — 700111 HCHG RX REV CODE 636 W/ 250 OVERRIDE (IP): Mod: JZ | Performed by: ANESTHESIOLOGY

## 2023-11-21 PROCEDURE — A9270 NON-COVERED ITEM OR SERVICE: HCPCS | Performed by: ANESTHESIOLOGY

## 2023-11-21 PROCEDURE — 700102 HCHG RX REV CODE 250 W/ 637 OVERRIDE(OP): Performed by: STUDENT IN AN ORGANIZED HEALTH CARE EDUCATION/TRAINING PROGRAM

## 2023-11-21 PROCEDURE — 36415 COLL VENOUS BLD VENIPUNCTURE: CPT

## 2023-11-21 RX ORDER — OXYCODONE HYDROCHLORIDE 5 MG/1
5 TABLET ORAL EVERY 4 HOURS PRN
Qty: 15 TABLET | Refills: 0 | Status: SHIPPED | OUTPATIENT
Start: 2023-11-21 | End: 2023-11-21 | Stop reason: SDUPTHER

## 2023-11-21 RX ORDER — PSEUDOEPHEDRINE HCL 30 MG
100 TABLET ORAL 2 TIMES DAILY PRN
Qty: 60 CAPSULE | Refills: 0 | Status: SHIPPED | OUTPATIENT
Start: 2023-11-21 | End: 2024-02-07

## 2023-11-21 RX ORDER — OXYCODONE HYDROCHLORIDE AND ACETAMINOPHEN 5; 325 MG/1; MG/1
1 TABLET ORAL EVERY 4 HOURS PRN
Qty: 15 TABLET | Refills: 0 | Status: SHIPPED | OUTPATIENT
Start: 2023-11-21 | End: 2023-11-28

## 2023-11-21 RX ORDER — ACETAMINOPHEN 500 MG
1000 TABLET ORAL EVERY 6 HOURS
Qty: 30 TABLET | Refills: 0 | Status: SHIPPED | OUTPATIENT
Start: 2023-11-21 | End: 2024-02-07

## 2023-11-21 RX ORDER — IBUPROFEN 600 MG/1
600 TABLET ORAL EVERY 8 HOURS
Qty: 30 TABLET | Refills: 0 | Status: SHIPPED | OUTPATIENT
Start: 2023-11-21 | End: 2023-12-01

## 2023-11-21 RX ORDER — DIPHENHYDRAMINE HYDROCHLORIDE 50 MG/ML
25 INJECTION INTRAMUSCULAR; INTRAVENOUS EVERY 6 HOURS PRN
Refills: 0 | COMMUNITY
Start: 2023-11-21 | End: 2023-11-21 | Stop reason: SDUPTHER

## 2023-11-21 RX ORDER — IBUPROFEN 600 MG/1
600 TABLET ORAL EVERY 8 HOURS
Qty: 30 TABLET | Refills: 0 | Status: SHIPPED | OUTPATIENT
Start: 2023-11-21 | End: 2023-11-21 | Stop reason: SDUPTHER

## 2023-11-21 RX ORDER — DIPHENHYDRAMINE HYDROCHLORIDE 50 MG/ML
25 INJECTION INTRAMUSCULAR; INTRAVENOUS EVERY 6 HOURS PRN
Refills: 0 | COMMUNITY
Start: 2023-11-21 | End: 2023-11-21

## 2023-11-21 RX ORDER — SIMETHICONE 125 MG
125 TABLET,CHEWABLE ORAL 4 TIMES DAILY PRN
Qty: 10 TABLET | Refills: 0 | Status: SHIPPED | OUTPATIENT
Start: 2023-11-21 | End: 2023-11-21 | Stop reason: SDUPTHER

## 2023-11-21 RX ORDER — OXYCODONE HYDROCHLORIDE 5 MG/1
5 TABLET ORAL EVERY 4 HOURS PRN
Qty: 15 TABLET | Refills: 0 | Status: SHIPPED | OUTPATIENT
Start: 2023-11-21 | End: 2023-11-23

## 2023-11-21 RX ORDER — PSEUDOEPHEDRINE HCL 30 MG
100 TABLET ORAL 2 TIMES DAILY PRN
Qty: 60 CAPSULE | Refills: 0 | Status: SHIPPED | OUTPATIENT
Start: 2023-11-21 | End: 2023-11-21 | Stop reason: SDUPTHER

## 2023-11-21 RX ORDER — ACETAMINOPHEN 500 MG
1000 TABLET ORAL EVERY 6 HOURS
Qty: 30 TABLET | Refills: 0 | Status: SHIPPED | OUTPATIENT
Start: 2023-11-21 | End: 2023-11-21 | Stop reason: SDUPTHER

## 2023-11-21 RX ORDER — SIMETHICONE 125 MG
125 TABLET,CHEWABLE ORAL 4 TIMES DAILY PRN
Qty: 10 TABLET | Refills: 0 | Status: SHIPPED | OUTPATIENT
Start: 2023-11-21

## 2023-11-21 RX ADMIN — ACETAMINOPHEN 1000 MG: 500 TABLET, FILM COATED ORAL at 15:33

## 2023-11-21 RX ADMIN — PRENATAL WITH FERROUS FUM AND FOLIC ACID 1 TABLET: 3080; 920; 120; 400; 22; 1.84; 3; 20; 10; 1; 12; 200; 27; 25; 2 TABLET ORAL at 09:10

## 2023-11-21 RX ADMIN — ACETAMINOPHEN 1000 MG: 500 TABLET, FILM COATED ORAL at 05:52

## 2023-11-21 RX ADMIN — KETOROLAC TROMETHAMINE 30 MG: 30 INJECTION, SOLUTION INTRAMUSCULAR; INTRAVENOUS at 10:06

## 2023-11-21 RX ADMIN — KETOROLAC TROMETHAMINE 30 MG: 30 INJECTION, SOLUTION INTRAMUSCULAR; INTRAVENOUS at 05:52

## 2023-11-21 ASSESSMENT — PAIN DESCRIPTION - PAIN TYPE
TYPE: ACUTE PAIN
TYPE: SURGICAL PAIN
TYPE: ACUTE PAIN
TYPE: SURGICAL PAIN

## 2023-11-21 ASSESSMENT — EDINBURGH POSTNATAL DEPRESSION SCALE (EPDS)
I HAVE BEEN ANXIOUS OR WORRIED FOR NO GOOD REASON: NO, NOT AT ALL
THE THOUGHT OF HARMING MYSELF HAS OCCURRED TO ME: NEVER
I HAVE LOOKED FORWARD WITH ENJOYMENT TO THINGS: AS MUCH AS I EVER DID
I HAVE BEEN SO UNHAPPY THAT I HAVE HAD DIFFICULTY SLEEPING: NOT AT ALL
I HAVE BLAMED MYSELF UNNECESSARILY WHEN THINGS WENT WRONG: NO, NEVER
I HAVE BEEN ABLE TO LAUGH AND SEE THE FUNNY SIDE OF THINGS: AS MUCH AS I ALWAYS COULD
I HAVE BEEN SO UNHAPPY THAT I HAVE BEEN CRYING: NO, NEVER
I HAVE FELT SCARED OR PANICKY FOR NO GOOD REASON: NO, NOT AT ALL
I HAVE FELT SAD OR MISERABLE: NOT VERY OFTEN
THINGS HAVE BEEN GETTING ON TOP OF ME: NO, I HAVE BEEN COPING AS WELL AS EVER

## 2023-11-21 NOTE — LACTATION NOTE
This note was copied from a baby's chart.  Baby 39 weeks, , baby 24 hours old- in NBN getting circumcision. MOB Hx Pericarditis, Seizures- no meds. MOB reports she breast fed 1st baby x 1 year with oversupply. Encouraged mother to call RN/LC with next latch to assess.     Feed baby with feeding cues and at least a minimum of 8x/24 hours.  Expect cluster feeding as this is normal during early days of life and growth spurts.  It is not recommended to let baby sleep longer than 4 hours between feedings and if sleepy, place skin to skin to promote feeding interest and milk production.  Baby's usually feed more frequently and longer when skin to skin with mother.     MOB has Medicaid & just singed-up with WIC here with WIC liaison.     Breastfeeding plan:  Breastfeed on cue a minimum 8 or more times in 24 hours no longer than 3 hours from last feed.

## 2023-11-21 NOTE — DISCHARGE INSTRUCTIONS
Pelvic rest x6 weeks  Return for follow up visit in 5-6 weeks.  Call or come to ED for: heavy vaginal bleeding, fever >100.4, severe abdominal pain, severe headache, chest pain, shortness of breath,  N/V, incisional drainage, or other concerns.      PATIENT DISCHARGE EDUCATION INSTRUCTION SHEET    REASONS TO CALL YOUR OBSTETRICIAN  Persistent fever, shaking, chills (Temperature higher than 100.4) may indicate you have an infection  Heavy bleeding: soaking more than 1 pad per hour; Passing clots an egg-sized clot or bigger may mean you have an postpartum hemorrhage  Foul odor from vagina or bad smelling or discolored discharge or blood  Breast infection (Mastitis symptoms); breast pain, chills, fever, redness or red streaks, may feel flu like symptoms  Urinary pain, burning or frequency  Incision that is not healing, increased redness, swelling, tenderness or pain, or any pus from episiotomy or  site may mean you have an infection  Redness, swelling, warmth, or painful to touch in the calf area of your leg may mean you have a blood clot  Severe or intensified depression, thoughts or feelings of wanting to hurt yourself or someone else   Pain in chest, obstructed breathing or shortness of breath (trouble catching your breath) may mean you are having a postpartum complication. Call your provider immediately   Headache that does not get better, even after taking medicine, a bad headache with vision changes or pain in the upper right area of your belly may mean you have high blood pressure or post birth preeclampsia. Call your provider immediately    HAND WASHING  All family and friends should wash their hands:  Before and after holding the baby  Before feeding the baby  After using the restroom or changing the baby's diaper    WOUND CARE  Ask your physician for additional care instructions. In general:   Incision:  May shower and pat incision dry   Keep the incision clean and dry  There should not be  any opening or pus from the incision  Continue to walk at home 3 times a day   Do NOT lift anything heavier than your baby (over 10 pounds)  Encourage family to help participate in care of the  to allow rest and mom time to heal  Episiotomy/Laceration  May use anthony-spray bottle, witch hazel pads and dermaplast spray for comfort  Use anthony-spray bottle after urinating to cleanse perineal area  To prevent burning during urination spray anthony-water bottle on labial area   Pat perineal area dry until episiotomy/laceration is healed  Continue to use anthony-bottle until bleeding stops as needed  If have a 2nd degree laceration or greater, a Sitz bath can offer relief from soreness, burning, and inflammation   Sitz Bath   Sit in 6 inches of warm water and soak laceration as needed until the laceration heals    VAGINAL CARE AND BLEEDING  Nothing inside vagina for 6 weeks:   No sexual intercourse, tampons or douching  Bleeding may continue for 2-4 weeks. Amount and color may vary  Soaking 1 pad or more in an hour for several hours is considered heavy bleeding  Passing large egg sized blood clots can be concerning  If you feel like you have heavy bleeding or are having increasing amount of blood clots call your Obstetrician immediately  If you begin feeling faint upon standing, feeling sick to your stomach, have clammy skin, a really fast heartbeat, have chills, start feeling confused, dizzy, sleepy or weak, or feeling like you're going to faint call your Obstetrician immediately    HYPERTENSION   Preeclampsia or gestational hypertension are types of high blood pressure that only pregnant women can get. It is important for you to be aware of symptoms to seek early intervention and treatment. If you have any of these symptoms immediately call your Obstetrician    Vision changes or blurred vision   Severe headache or pain that is unrelieved with medication and will not go away  Persistent pain in upper abdomen or shoulder  "  Increased swelling of face, feet, or hands  Difficulty breathing or shortness of breath at rest  Urinating less than usual    URINATION AND BOWEL MOVEMENTS  Eating more fiber (bran cereal, fruits, and vegetables) and drinking plenty of fluids will help to avoid constipation  Urinary frequency and urgency after childbirth is normal  If you experience any urinary pain, burning or frequency call your provider    BIRTH CONTROL  It is possible to become pregnant at any time after delivery and while breastfeeding  Plan to discuss a method of birth control with your physician at your post delivery follow up visit    POSTPARTUM BLUES  During the first few days after birth, you may experience a sense of the \"blues\" which may include impatience, irritability or even crying. These feelings come and go quickly. However, as many as 1 in 10 women experience emotional symptoms known as postpartum depression.     POSTPARTUM DEPRESSION    May start as early as the second or third day after delivery or take several weeks or months to develop. Symptoms of \"blues\" are present, but are more intense: Crying spells; loss of appetite; feelings of hopelessness or loss of control; fear of touching the baby; over concern or no concern at all about the baby; little or no concern about your own appearance/caring for yourself; and/or inability to sleep or excessive sleeping. Contact your Obstetrician if you are experiencing any of these symptoms     PREVENTING SHAKEN BABY  If you are angry or stressed, PUT THE BABY IN THE CRIB, step away, take some deep breaths, and wait until you are calm to care for the baby. DO NOT SHAKE THE BABY. You are not alone, call a supporter for help.  [facility] 24/7 crisis call line ([phone]) or (1-560.885.6061)  You can also text them, text \"ANSWER\" ([phone])  "

## 2023-11-21 NOTE — DISCHARGE SUMMARY
"  Discharge Summary:     Date of Admission: 2023  Date of Discharge: 23      Admitting diagnosis:    1. Pregnancy @ 39w0d  2. Repeat c section       Discharge Diagnosis:   1. Status post  for repeat     Past Medical History:   Diagnosis Date    Arrhythmia     Skip a beat    GERD (gastroesophageal reflux disease)     Heart murmur     Migraine     Pregnant     Seizure (HCC)     Last seizure ?     OB History    Para Term  AB Living   2 2 2     2   SAB IAB Ectopic Molar Multiple Live Births           0 2      # Outcome Date GA Lbr Justin/2nd Weight Sex Delivery Anes PTL Lv   2 Term 23 39w0d  3.55 kg (7 lb 13.2 oz) M CS-LVertical Spinal N SNEHA   1 Term 18 40w0d  4.167 kg (9 lb 3 oz) F CS-Unspec Spinal N SNEHA      Birth Comments: Primary C/S due to FTP only dilated to 9 cm, PIH, pt states her incision opened up from inside out had to pack for 6 weeks.     Past Surgical History:   Procedure Laterality Date    ABDOMINAL EXPLORATION      ADENOIDECTOMY      APPENDECTOMY      OTHER      Tonsils, adenoids, appendix,    PRIMARY C SECTION       Influenza vaccines, Penicillins, and Tetanus-diphth-acell pertussis    Patient Active Problem List   Diagnosis    Hx of preeclampsia, prior pregnancy - C/S at 40wk, ASA started    History of pericarditis - cardiology appt wnl, no f/u or recommendations needed    History of C/S x 1 for FTP - desires repeat, unsure BTL    Multiple allergies - \"all vaccines\" and PCN, tape    History of macrosomia in infant in prior pregnancy - 9lb3oz    Thrombocytopenia (HCC)    Labor and delivery, indication for care       Hospital Course:   Pt is a 28 y.o. now  who presented for repeat  at 39 weeks and 0 days.   with bilateral salpingectomy performed on , but no bilateral salpingectomy could be performed secondary to dense adhesions.  Otherwise there were no complications.  The patient's pain was well-controlled with " Tylenol, Toradol and only 1 times oxycodone.  At the time of discharge her wound is well-approximated without discharge noted, she is able to ambulate, she is tolerating p.o. intake, and is having bowel movements and urination without issue.  She feels that she has support at home in the form of father of baby and this will be discharged in fair condition.  There are discharge orders in for the .      Physical Exam:  Temp:  [36.1 °C (97 °F)-36.3 °C (97.4 °F)] 36.2 °C (97.2 °F)  Pulse:  [65-87] 78  Resp:  [16-20] 18  BP: (106-124)/(66-83) 124/83  SpO2:  [93 %-99 %] 99 %  Physical Exam  General: well  Abdomen: nontender, soft  Fundus: firm and below umbilicus  Incision: healing well, no significant drainage, no dehiscence, and no significant erythema  Perineum: deferred  Extremities: symmetric and no edema, calves nontender    Current Facility-Administered Medications   Medication Dose    lactated ringers (LR) infusion      oxytocin (Pitocin) infusion (for post delivery)  125 mL/hr    oxytocin (Pitocin) injection 10 Units  10 Units    lactated ringers infusion      ibuprofen (Motrin) tablet 800 mg  800 mg    Followed by    [START ON 2023] ibuprofen (Motrin) tablet 800 mg  800 mg    acetaminophen (Tylenol) tablet 1,000 mg  1,000 mg    Followed by    [START ON 2023] acetaminophen (Tylenol) tablet 1,000 mg  1,000 mg    oxyCODONE immediate-release (Roxicodone) tablet 5 mg  5 mg    oxyCODONE immediate release (Roxicodone) tablet 10 mg  10 mg    ondansetron (Zofran) syringe/vial injection 4 mg  4 mg    Or    ondansetron (Zofran ODT) dispertab 4 mg  4 mg    diphenhydrAMINE (Benadryl) tablet/capsule 25 mg  25 mg    Or    diphenhydrAMINE (Benadryl) injection 25 mg  25 mg    docusate sodium (Colace) capsule 100 mg  100 mg    magnesium hydroxide (Milk Of Magnesia) suspension 30 mL  30 mL    bisacodyl (Dulcolax) suppository 10 mg  10 mg    prenatal plus vitamin (Stuartnatal 1+1) 27-1 MG tablet 1 Tablet  1  Tablet    simethicone (Mylicon) chewable tablet 125 mg  125 mg    calcium carbonate (Tums) chewable tab 1,000 mg  1,000 mg    lactated ringers infusion         Recent Labs     23  0827 23  0453   WBC 9.9 13.2*   RBC 4.95 3.72*   HEMOGLOBIN 15.6 11.6*   HEMATOCRIT 45.3 33.8*   MCV 91.5 90.9   MCH 31.5 31.2   MCHC 34.4 34.3   RDW 46.1 45.1   PLATELETCT 114* 105*   MPV 12.4 12.3         Activity/ Discharge Instructions::   Discharge to home  Pelvic Rest x 6 weeks  No heavy lifting x4 weeks  Call or come to ED for: heavy vaginal bleeding, fever >100.4, severe abdominal pain, severe headache, chest pain, shortness of breath,  N/V, incisional drainage, or other concerns.       Follow up:  [facility] in 5 weeks for vaginal delivery; 1 week for incision check for  delivery.     Discharge Meds:   Current Outpatient Medications   Medication Sig Dispense Refill    acetaminophen (TYLENOL) 500 MG Tab Take 2 Tablets by mouth every 6 hours. 30 Tablet 0    calcium carbonate 1000 MG Chew Tab Chew 1 Tablet every 6 hours as needed (Heartburn). 30 Tablet     diphenhydrAMINE (BENADRYL) 50 MG/ML Solution Infuse 0.5 mL into a venous catheter every 6 hours as needed (Severe Itching).  0    docusate sodium 100 MG Cap Take 100 mg by mouth 2 times a day as needed for Constipation. 60 Capsule 0    ibuprofen (MOTRIN) 600 MG Tab Take 1 Tablet by mouth every 8 hours for 10 days. 30 Tablet 0    oxyCODONE immediate-release (ROXICODONE) 5 MG Tab Take 1 Tablet by mouth every four hours as needed for Severe Pain for up to 15 doses. 15 Tablet 0    simethicone (MYLICON) 125 MG chewable tablet Chew 1 Tablet 4 times a day as needed for Flatulence for up to 10 doses. 10 Tablet 0       Wilfrido Valencia M.D.  PGY-1  [facility]

## 2023-11-21 NOTE — CARE PLAN
The patient is Stable - Low risk of patient condition declining or worsening    Shift Goals  Clinical Goals: fundus and lochia WNL, pain management, ambulate, maintain adequate urine output, VSS  Patient Goals: pain management, rest  Family Goals: support    Progress made toward(s) clinical / shift goals: Fundus firm, lochia scant. Pain managed with scheduled pain medications per MAR. Pt ambulates with steady gait. Pt maintains adequate urine output. Pt's VS stable. Pt resting with support at bedside.    Problem: Pain - Standard  Goal: Alleviation of pain or a reduction in pain to the patient’s comfort goal  Outcome: Progressing     Problem: Altered Physiologic Condition  Goal: Patient physiologically stable as evidenced by normal lochia, palpable uterine involution and vitals within normal limits  Outcome: Progressing     Patient is not progressing towards the following goals: NA

## 2023-11-21 NOTE — CARE PLAN
The patient is Stable - Low risk of patient condition declining or worsening    Shift Goals  Clinical Goals: patient will report good pain control through end of shift  Patient Goals: conding  Family Goals: updated on poc    Progress made toward(s) clinical / shift goals:  good pain control. Lochia wdl. Ambulatory. Good latch with baby.    Patient is not progressing towards the following goals:

## 2023-11-21 NOTE — PROGRESS NOTES
Post Partum Progress Note    Name:   Khris Mays   Date/Time:  2023 - 6:36 AM  Chief Admitting Dx:  Labor and delivery, indication for care [O75.9]  Delivery Type:   for repeat  Post-Op/Post Partum Days #:  1    Subjective:  Abdominal pain: no  Ambulating:   yes  Tolerating liquids:  yes  Tolerating food:  yes common adult  Flatus:   yes  BM:    no  Bleeding:   with a small amount of bleeding  Voiding:   yes  Dizziness:   no  Feeding:   breast    Vitals:    23 0000 23 0110 23 0200 23 0600   BP:   108/78 116/81   Pulse: 65 74 65 69   Resp: 16 18 18 18   Temp:   36.1 °C (97 °F) 36.3 °C (97.4 °F)   TempSrc:   Temporal Temporal   SpO2: 96% 93% 94% 95%   Weight:       Height:           Exam:  Breast: Tenderness no  Abdomen: Abdomen soft, non-tender. BS normal. No masses,  No organomegaly  Fundal Tenderness:  no  Fundus Firm: yes  Incision: dry and intact  Below umbilicus: yes  Perineum: perineum intact  Lochia: moderate  Extremities: Normal extremities, peripheral pulses and reflexes normal, no edema, redness or tenderness in the calves or thighs    Meds:  Current Facility-Administered Medications   Medication Dose    lactated ringers (LR) infusion      LR infusion      oxytocin (Pitocin) infusion bolus (for post delivery)  20 Units    Followed by    oxytocin (Pitocin) infusion (for post delivery)  125 mL/hr    oxytocin (Pitocin) injection 10 Units  10 Units    lactated ringers infusion      ibuprofen (Motrin) tablet 800 mg  800 mg    Followed by    [START ON 2023] ibuprofen (Motrin) tablet 800 mg  800 mg    acetaminophen (Tylenol) tablet 1,000 mg  1,000 mg    Followed by    [START ON 2023] acetaminophen (Tylenol) tablet 1,000 mg  1,000 mg    oxyCODONE immediate-release (Roxicodone) tablet 5 mg  5 mg    oxyCODONE immediate release (Roxicodone) tablet 10 mg  10 mg    ondansetron (Zofran) syringe/vial injection 4 mg  4 mg    Or    ondansetron (Zofran ODT) dispertab  4 mg  4 mg    diphenhydrAMINE (Benadryl) tablet/capsule 25 mg  25 mg    Or    diphenhydrAMINE (Benadryl) injection 25 mg  25 mg    docusate sodium (Colace) capsule 100 mg  100 mg    magnesium hydroxide (Milk Of Magnesia) suspension 30 mL  30 mL    bisacodyl (Dulcolax) suppository 10 mg  10 mg    prenatal plus vitamin (Stuartnatal 1+1) 27-1 MG tablet 1 Tablet  1 Tablet    simethicone (Mylicon) chewable tablet 125 mg  125 mg    calcium carbonate (Tums) chewable tab 1,000 mg  1,000 mg    ketorolac (Toradol) injection 30 mg  30 mg    oxyCODONE immediate-release (Roxicodone) tablet 5 mg  5 mg    oxyCODONE immediate release (Roxicodone) tablet 10 mg  10 mg    HYDROmorphone (Dilaudid) injection 0.2 mg  0.2 mg    ePHEDrine injection 10 mg  10 mg    ondansetron (Zofran) syringe/vial injection 4 mg  4 mg    diphenhydrAMINE (Benadryl) injection 12.5 mg  12.5 mg    Or    diphenhydrAMINE (Benadryl) injection 25 mg  25 mg    Or    naloxone HCl (Narcan) 20 mg in  mL infusion  0.4 mg/hr    lactated ringers infusion         Labs:   Recent Labs     23  0827 23  0453   WBC 9.9 13.2*   RBC 4.95 3.72*   HEMOGLOBIN 15.6 11.6*   HEMATOCRIT 45.3 33.8*   MCV 91.5 90.9   MCH 31.5 31.2   MCHC 34.4 34.3   RDW 46.1 45.1   PLATELETCT 114* 105*   MPV 12.4 12.3       Assessment:  Chief Admitting Dx:  Labor and delivery, indication for care [O75.9]  Delivery Type:   for repeat  Tubal Ligation:  no    Plan:  Continue routine post partum care.  POD #1 doing well  Repeat platelet check this PM    ADY Sullivan

## 2023-11-21 NOTE — PROGRESS NOTES
Assessment complete. Fundus firm, lochia scant. VSS. Tolerating diet. Maintaining adequate urine output with Blevins catheter in place. Incision dressing CDI. Pain controlled with scheduled medications per MAR, pt denies pain at this time. Will offer pain medications as they become available. FOB at bedside, bonding with pt/baby. POC discussed with pt. Encouraged to call with needs. Call light in place.

## 2023-11-21 NOTE — CARE PLAN
The patient is Stable - Low risk of patient condition declining or worsening    Shift Goals  Clinical Goals: vss, lochia WNL, adequate pain, bond  Patient Goals: Healthy mom, healthy patient  Family Goals: Support      Problem: Psychosocial - Postpartum  Goal: Patient will verbalize and demonstrate effective bonding and parenting behavior  Outcome: Progressing  Note: MOB has shown adequate bonding with infant, provided skin to skin, and proper cares of infant.      Problem: Knowledge Deficit - Postpartum  Goal: Patient will verbalize and demonstrate understanding of self and infant care  Outcome: Progressing  Note: MOB demonstrates and verbalizes understanding on how to care for . Asks appropriate questions and calls for help when necessary. Education has been provided to patient.

## 2023-11-22 NOTE — PROGRESS NOTES
All discharge education given for mother and infant. Father of baby at bedside for ll education. Any questions answered. PIV removed. All belongings gathered.

## 2023-11-29 ENCOUNTER — APPOINTMENT (OUTPATIENT)
Dept: OBGYN | Facility: CLINIC | Age: 28
End: 2023-11-29
Payer: MEDICAID

## 2023-12-04 ENCOUNTER — GYNECOLOGY VISIT (OUTPATIENT)
Dept: OBGYN | Facility: CLINIC | Age: 28
End: 2023-12-04
Payer: MEDICAID

## 2023-12-04 VITALS
DIASTOLIC BLOOD PRESSURE: 64 MMHG | BODY MASS INDEX: 27.25 KG/M2 | WEIGHT: 159.6 LBS | SYSTOLIC BLOOD PRESSURE: 106 MMHG | HEIGHT: 64 IN

## 2023-12-04 DIAGNOSIS — Z48.89 ENCOUNTER FOR POST SURGICAL WOUND CHECK: ICD-10-CM

## 2023-12-04 PROCEDURE — 0503F POSTPARTUM CARE VISIT: CPT | Performed by: OBSTETRICS & GYNECOLOGY

## 2023-12-04 PROCEDURE — 3074F SYST BP LT 130 MM HG: CPT | Performed by: OBSTETRICS & GYNECOLOGY

## 2023-12-04 PROCEDURE — 3078F DIAST BP <80 MM HG: CPT | Performed by: OBSTETRICS & GYNECOLOGY

## 2023-12-04 ASSESSMENT — FIBROSIS 4 INDEX: FIB4 SCORE: 1.411300658019085202

## 2023-12-04 NOTE — PROGRESS NOTES
Post OP C/Section check  Operation date: 11/20/2023  WT: 159.6 lb  BP: 106/64  Good # [phone]    Pt states left side incision pain. States no other complaints or concerns today      EPDS Score: 0

## 2023-12-04 NOTE — PROGRESS NOTES
Incision Check     CC: Incision Check     HPI: 28 y.o.  s/p  delivery on 23 with Dr. Lucero presents today for routine incision check  Pt reports doing well.  Denies fevers/chills.  Denies incisional redness/pain or drainage.  Normal lochia.     Is breat feeding     Denies concerns about mood.     Surgery complicated by dense adhesions and unable to complete sterilization. States Dr. Lucero scheduled her for lap tubal in January.     Vitals:    23 0802   BP: 106/64      Gen: AAO, NAD  Abd: soft, NT, ND,   Incision C/D/I, healing well. Dressing and steri strips removed     A/P: 28 y.o.  s/p LTCS  - no signs of postop complications  - encouraged BFing, lactation visit prn  - no signs of PP depression     RTC for routine postpartum at 6wks PP    Kristina Luevano D.O.

## 2023-12-14 ENCOUNTER — HOSPITAL ENCOUNTER (OUTPATIENT)
Facility: MEDICAL CENTER | Age: 28
End: 2023-12-14
Attending: OBSTETRICS & GYNECOLOGY | Admitting: OBSTETRICS & GYNECOLOGY
Payer: MEDICAID

## 2024-01-02 ENCOUNTER — APPOINTMENT (OUTPATIENT)
Dept: ADMISSIONS | Facility: MEDICAL CENTER | Age: 29
End: 2024-01-02
Attending: OBSTETRICS & GYNECOLOGY
Payer: MEDICAID

## 2024-01-09 ENCOUNTER — POST PARTUM (OUTPATIENT)
Dept: OBGYN | Facility: CLINIC | Age: 29
End: 2024-01-09
Payer: MEDICAID

## 2024-01-09 ENCOUNTER — TELEPHONE (OUTPATIENT)
Dept: OBGYN | Facility: CLINIC | Age: 29
End: 2024-01-09

## 2024-01-09 VITALS — BODY MASS INDEX: 27.81 KG/M2 | WEIGHT: 162 LBS | SYSTOLIC BLOOD PRESSURE: 100 MMHG | DIASTOLIC BLOOD PRESSURE: 60 MMHG

## 2024-01-09 PROCEDURE — 3078F DIAST BP <80 MM HG: CPT

## 2024-01-09 PROCEDURE — 3074F SYST BP LT 130 MM HG: CPT

## 2024-01-09 PROCEDURE — 0503F POSTPARTUM CARE VISIT: CPT

## 2024-01-09 ASSESSMENT — FIBROSIS 4 INDEX: FIB4 SCORE: 1.411300658019085202

## 2024-01-09 ASSESSMENT — EDINBURGH POSTNATAL DEPRESSION SCALE (EPDS)
I HAVE FELT SAD OR MISERABLE: NOT VERY OFTEN
THINGS HAVE BEEN GETTING ON TOP OF ME: NO, MOST OF THE TIME I HAVE COPED QUITE WELL
I HAVE FELT SCARED OR PANICKY FOR NO GOOD REASON: NO, NOT AT ALL
I HAVE BEEN ANXIOUS OR WORRIED FOR NO GOOD REASON: NO, NOT AT ALL
I HAVE BEEN SO UNHAPPY THAT I HAVE BEEN CRYING: NO, NEVER
I HAVE BEEN ABLE TO LAUGH AND SEE THE FUNNY SIDE OF THINGS: AS MUCH AS I ALWAYS COULD
THE THOUGHT OF HARMING MYSELF HAS OCCURRED TO ME: NEVER
I HAVE BEEN SO UNHAPPY THAT I HAVE HAD DIFFICULTY SLEEPING: NOT AT ALL
I HAVE BLAMED MYSELF UNNECESSARILY WHEN THINGS WENT WRONG: NOT VERY OFTEN
TOTAL SCORE: 3
I HAVE LOOKED FORWARD WITH ENJOYMENT TO THINGS: AS MUCH AS I EVER DID

## 2024-01-09 NOTE — PROGRESS NOTES
"Pt here today for postpartum exam.  Delivery type: C/S 11/20/23 with no BTL pt states they told her right tube was to damaged. \"Due to dense adhesions, sterilization procedure could not be performed.\"   Currently : breast feeding   Desired BCM: pt is getting surgery on the 31st.   LMP: NA  Last pap: 6/23/23 wnl   Phone # [phone]  EPDS- 3  "

## 2024-01-09 NOTE — PROGRESS NOTES
"  Subjective:    Khris Mays is a 28 y.o. female who presents for her postpartum exam 7 weeks following RCS.  Her delivery was uncomplicated, though was unable to have BTL completed d/t adhesions. She has an appointment this week for BTL with Dr. Lucero, though she is going to move her appointment to another date due to her 's work schedule.     Reports sex prior to this appointment with pull-out method to avoid pregnancy. Her method of feeding infant is breastfeeding, denied concerns though her infant is teething. Patient denies crying spells, irritability, or mood swings.     Pap WNL on 6/23/23    Eating a regular diet without difficulty  Bowel movement are normal.    Breast problems: denies  Dysuria: denies  Vaginal bleeding: denies  Vaginal itching: denies      Patient Active Problem List    Diagnosis Date Noted    Thrombocytopenia (HCC) 10/23/2023    Hx of preeclampsia, prior pregnancy - C/S at 40wk, ASA started 06/15/2023    History of pericarditis - cardiology appt wnl, no f/u or recommendations needed 06/15/2023    History of C/S x 1 for FTP - desires repeat, unsure BTL 06/15/2023    Multiple allergies - \"all vaccines\" and PCN, tape 06/15/2023    History of macrosomia in infant in prior pregnancy - 9lb3oz 06/15/2023       Objective:      EDPS 3/30    Lab:No results found for this or any previous visit (from the past 1008 hour(s)).  Vitals:    01/09/24 0851   BP: 100/60   Weight: 162 lb     Vitals:    01/09/24 0851   BP: 100/60       Physical Exam:  General: well nourished female in no acute distress; A&O x 3  Lungs: respirations clear and non labored on room air  Heart: regular rate and rhythm; pulses WNL bilaterally in lower extremities; no claudication  Musculoskeletal: no swelling to bilateral lower extremities; 2 separation of abdominal muscles  GI: BS x 4; no guarding or tenderness; uterus non-palpable  Breasts: no erythema or discharge. No masses or tenderness.     Genitourinary: " deferred, denies concerns       Assessment:    1. PP care of lactating woman  2. PP exam WNL  3. Pap WNL on 6/23/23  4. Desires contraception      Plan:    1. Offered breastfeeding support   2. Continue PNV  3. Contraceptive counseling: BTL planned for next month  4. Discussed diet, exercise and resumption of sexual activity.  Discussed signs and symptoms of stress incontinence and reviewed pelvic floor exercises.    5. Follow up as needed      Elisabet Del Valle CNM

## 2024-01-30 ENCOUNTER — APPOINTMENT (OUTPATIENT)
Dept: ADMISSIONS | Facility: MEDICAL CENTER | Age: 29
End: 2024-01-30
Attending: OBSTETRICS & GYNECOLOGY
Payer: MEDICAID

## 2024-02-07 ENCOUNTER — PRE-ADMISSION TESTING (OUTPATIENT)
Dept: ADMISSIONS | Facility: MEDICAL CENTER | Age: 29
End: 2024-02-07
Attending: OBSTETRICS & GYNECOLOGY
Payer: MEDICAID

## 2024-02-07 RX ORDER — PYRIDOXINE HCL (VITAMIN B6) 50 MG
50 TABLET ORAL DAILY
COMMUNITY

## 2024-02-12 ENCOUNTER — PRE-ADMISSION TESTING (OUTPATIENT)
Dept: ADMISSIONS | Facility: MEDICAL CENTER | Age: 29
End: 2024-02-12
Attending: OBSTETRICS & GYNECOLOGY
Payer: MEDICAID

## 2024-02-12 DIAGNOSIS — Z01.812 PRE-OPERATIVE LABORATORY EXAMINATION: ICD-10-CM

## 2024-02-12 LAB
ANION GAP SERPL CALC-SCNC: 13 MMOL/L (ref 7–16)
BASOPHILS # BLD AUTO: 0.4 % (ref 0–1.8)
BASOPHILS # BLD: 0.02 K/UL (ref 0–0.12)
BUN SERPL-MCNC: 21 MG/DL (ref 8–22)
CALCIUM SERPL-MCNC: 9.2 MG/DL (ref 8.5–10.5)
CHLORIDE SERPL-SCNC: 104 MMOL/L (ref 96–112)
CO2 SERPL-SCNC: 27 MMOL/L (ref 20–33)
CREAT SERPL-MCNC: 0.82 MG/DL (ref 0.5–1.4)
EOSINOPHIL # BLD AUTO: 0.12 K/UL (ref 0–0.51)
EOSINOPHIL NFR BLD: 2.1 % (ref 0–6.9)
ERYTHROCYTE [DISTWIDTH] IN BLOOD BY AUTOMATED COUNT: 41.9 FL (ref 35.9–50)
GFR SERPLBLD CREATININE-BSD FMLA CKD-EPI: 99 ML/MIN/1.73 M 2
GLUCOSE SERPL-MCNC: 84 MG/DL (ref 65–99)
HCG UR QL: NEGATIVE
HCT VFR BLD AUTO: 44.9 % (ref 37–47)
HGB BLD-MCNC: 15.4 G/DL (ref 12–16)
IMM GRANULOCYTES # BLD AUTO: 0.02 K/UL (ref 0–0.11)
IMM GRANULOCYTES NFR BLD AUTO: 0.4 % (ref 0–0.9)
LYMPHOCYTES # BLD AUTO: 1.82 K/UL (ref 1–4.8)
LYMPHOCYTES NFR BLD: 32 % (ref 22–41)
MCH RBC QN AUTO: 30.9 PG (ref 27–33)
MCHC RBC AUTO-ENTMCNC: 34.3 G/DL (ref 32.2–35.5)
MCV RBC AUTO: 90.2 FL (ref 81.4–97.8)
MONOCYTES # BLD AUTO: 0.28 K/UL (ref 0–0.85)
MONOCYTES NFR BLD AUTO: 4.9 % (ref 0–13.4)
NEUTROPHILS # BLD AUTO: 3.43 K/UL (ref 1.82–7.42)
NEUTROPHILS NFR BLD: 60.2 % (ref 44–72)
NRBC # BLD AUTO: 0 K/UL
NRBC BLD-RTO: 0 /100 WBC (ref 0–0.2)
PLATELET # BLD AUTO: 283 K/UL (ref 164–446)
PMV BLD AUTO: 10.4 FL (ref 9–12.9)
POTASSIUM SERPL-SCNC: 4 MMOL/L (ref 3.6–5.5)
RBC # BLD AUTO: 4.98 M/UL (ref 4.2–5.4)
SODIUM SERPL-SCNC: 144 MMOL/L (ref 135–145)
WBC # BLD AUTO: 5.7 K/UL (ref 4.8–10.8)

## 2024-02-12 PROCEDURE — 80048 BASIC METABOLIC PNL TOTAL CA: CPT

## 2024-02-12 PROCEDURE — 81025 URINE PREGNANCY TEST: CPT

## 2024-02-12 PROCEDURE — 36415 COLL VENOUS BLD VENIPUNCTURE: CPT

## 2024-02-12 PROCEDURE — 85025 COMPLETE CBC W/AUTO DIFF WBC: CPT

## 2024-03-04 ENCOUNTER — ANESTHESIA EVENT (OUTPATIENT)
Dept: SURGERY | Facility: MEDICAL CENTER | Age: 29
End: 2024-03-04
Payer: MEDICAID

## 2024-03-04 ENCOUNTER — HOSPITAL ENCOUNTER (OUTPATIENT)
Facility: MEDICAL CENTER | Age: 29
End: 2024-03-04
Attending: OBSTETRICS & GYNECOLOGY | Admitting: OBSTETRICS & GYNECOLOGY
Payer: MEDICAID

## 2024-03-04 ENCOUNTER — ANESTHESIA (OUTPATIENT)
Dept: SURGERY | Facility: MEDICAL CENTER | Age: 29
End: 2024-03-04
Payer: MEDICAID

## 2024-03-04 VITALS
WEIGHT: 161.16 LBS | TEMPERATURE: 97.8 F | SYSTOLIC BLOOD PRESSURE: 138 MMHG | HEART RATE: 97 BPM | BODY MASS INDEX: 27.51 KG/M2 | HEIGHT: 64 IN | OXYGEN SATURATION: 96 % | RESPIRATION RATE: 17 BRPM | DIASTOLIC BLOOD PRESSURE: 94 MMHG

## 2024-03-04 LAB
HCG UR QL: NEGATIVE
PATHOLOGY CONSULT NOTE: NORMAL

## 2024-03-04 PROCEDURE — 160046 HCHG PACU - 1ST 60 MINS PHASE II: Performed by: OBSTETRICS & GYNECOLOGY

## 2024-03-04 PROCEDURE — 160002 HCHG RECOVERY MINUTES (STAT): Performed by: OBSTETRICS & GYNECOLOGY

## 2024-03-04 PROCEDURE — 58661 LAPAROSCOPY REMOVE ADNEXA: CPT | Performed by: OBSTETRICS & GYNECOLOGY

## 2024-03-04 PROCEDURE — 88302 TISSUE EXAM BY PATHOLOGIST: CPT

## 2024-03-04 PROCEDURE — 700111 HCHG RX REV CODE 636 W/ 250 OVERRIDE (IP): Mod: JZ,UD | Performed by: ANESTHESIOLOGY

## 2024-03-04 PROCEDURE — 160009 HCHG ANES TIME/MIN: Performed by: OBSTETRICS & GYNECOLOGY

## 2024-03-04 PROCEDURE — 700105 HCHG RX REV CODE 258: Mod: UD | Performed by: ANESTHESIOLOGY

## 2024-03-04 PROCEDURE — 81025 URINE PREGNANCY TEST: CPT

## 2024-03-04 PROCEDURE — 160028 HCHG SURGERY MINUTES - 1ST 30 MINS LEVEL 3: Performed by: OBSTETRICS & GYNECOLOGY

## 2024-03-04 PROCEDURE — A9270 NON-COVERED ITEM OR SERVICE: HCPCS | Mod: UD | Performed by: ANESTHESIOLOGY

## 2024-03-04 PROCEDURE — 160025 RECOVERY II MINUTES (STATS): Performed by: OBSTETRICS & GYNECOLOGY

## 2024-03-04 PROCEDURE — 58661 LAPAROSCOPY REMOVE ADNEXA: CPT | Mod: 80 | Performed by: OBSTETRICS & GYNECOLOGY

## 2024-03-04 PROCEDURE — 160039 HCHG SURGERY MINUTES - EA ADDL 1 MIN LEVEL 3: Performed by: OBSTETRICS & GYNECOLOGY

## 2024-03-04 PROCEDURE — 160048 HCHG OR STATISTICAL LEVEL 1-5: Performed by: OBSTETRICS & GYNECOLOGY

## 2024-03-04 PROCEDURE — 700101 HCHG RX REV CODE 250: Mod: UD | Performed by: ANESTHESIOLOGY

## 2024-03-04 PROCEDURE — 700102 HCHG RX REV CODE 250 W/ 637 OVERRIDE(OP): Mod: UD | Performed by: ANESTHESIOLOGY

## 2024-03-04 PROCEDURE — 160035 HCHG PACU - 1ST 60 MINS PHASE I: Performed by: OBSTETRICS & GYNECOLOGY

## 2024-03-04 PROCEDURE — 700101 HCHG RX REV CODE 250: Mod: UD | Performed by: OBSTETRICS & GYNECOLOGY

## 2024-03-04 RX ORDER — SODIUM CHLORIDE, SODIUM LACTATE, POTASSIUM CHLORIDE, CALCIUM CHLORIDE 600; 310; 30; 20 MG/100ML; MG/100ML; MG/100ML; MG/100ML
INJECTION, SOLUTION INTRAVENOUS CONTINUOUS
Status: DISCONTINUED | OUTPATIENT
Start: 2024-03-04 | End: 2024-03-04 | Stop reason: HOSPADM

## 2024-03-04 RX ORDER — KETOROLAC TROMETHAMINE 15 MG/ML
INJECTION, SOLUTION INTRAMUSCULAR; INTRAVENOUS PRN
Status: DISCONTINUED | OUTPATIENT
Start: 2024-03-04 | End: 2024-03-04 | Stop reason: SURG

## 2024-03-04 RX ORDER — SODIUM CHLORIDE, SODIUM LACTATE, POTASSIUM CHLORIDE, CALCIUM CHLORIDE 600; 310; 30; 20 MG/100ML; MG/100ML; MG/100ML; MG/100ML
INJECTION, SOLUTION INTRAVENOUS
Status: DISCONTINUED | OUTPATIENT
Start: 2024-03-04 | End: 2024-03-04 | Stop reason: SURG

## 2024-03-04 RX ORDER — ONDANSETRON 2 MG/ML
INJECTION INTRAMUSCULAR; INTRAVENOUS PRN
Status: DISCONTINUED | OUTPATIENT
Start: 2024-03-04 | End: 2024-03-04 | Stop reason: SURG

## 2024-03-04 RX ORDER — DEXAMETHASONE SODIUM PHOSPHATE 4 MG/ML
INJECTION, SOLUTION INTRA-ARTICULAR; INTRALESIONAL; INTRAMUSCULAR; INTRAVENOUS; SOFT TISSUE PRN
Status: DISCONTINUED | OUTPATIENT
Start: 2024-03-04 | End: 2024-03-04 | Stop reason: SURG

## 2024-03-04 RX ORDER — BUPIVACAINE HYDROCHLORIDE 2.5 MG/ML
INJECTION, SOLUTION EPIDURAL; INFILTRATION; INTRACAUDAL
Status: DISCONTINUED
Start: 2024-03-04 | End: 2024-03-04 | Stop reason: HOSPADM

## 2024-03-04 RX ORDER — OXYCODONE HCL 5 MG/5 ML
10 SOLUTION, ORAL ORAL
Status: COMPLETED | OUTPATIENT
Start: 2024-03-04 | End: 2024-03-04

## 2024-03-04 RX ORDER — LIDOCAINE HYDROCHLORIDE 20 MG/ML
INJECTION, SOLUTION EPIDURAL; INFILTRATION; INTRACAUDAL; PERINEURAL PRN
Status: DISCONTINUED | OUTPATIENT
Start: 2024-03-04 | End: 2024-03-04 | Stop reason: SURG

## 2024-03-04 RX ORDER — PROMETHAZINE HYDROCHLORIDE 12.5 MG/1
12.5 SUPPOSITORY RECTAL EVERY 4 HOURS PRN
Status: DISCONTINUED | OUTPATIENT
Start: 2024-03-04 | End: 2024-03-04 | Stop reason: HOSPADM

## 2024-03-04 RX ORDER — EPINEPHRINE 1 MG/ML(1)
AMPUL (ML) INJECTION
Status: DISCONTINUED
Start: 2024-03-04 | End: 2024-03-04 | Stop reason: HOSPADM

## 2024-03-04 RX ORDER — BUPIVACAINE HYDROCHLORIDE AND EPINEPHRINE 2.5; 5 MG/ML; UG/ML
INJECTION, SOLUTION EPIDURAL; INFILTRATION; INTRACAUDAL; PERINEURAL
Status: DISCONTINUED | OUTPATIENT
Start: 2024-03-04 | End: 2024-03-04 | Stop reason: HOSPADM

## 2024-03-04 RX ORDER — HYDROMORPHONE HYDROCHLORIDE 1 MG/ML
0.2 INJECTION, SOLUTION INTRAMUSCULAR; INTRAVENOUS; SUBCUTANEOUS
Status: DISCONTINUED | OUTPATIENT
Start: 2024-03-04 | End: 2024-03-04 | Stop reason: HOSPADM

## 2024-03-04 RX ORDER — HYDRALAZINE HYDROCHLORIDE 20 MG/ML
5 INJECTION INTRAMUSCULAR; INTRAVENOUS
Status: DISCONTINUED | OUTPATIENT
Start: 2024-03-04 | End: 2024-03-04 | Stop reason: HOSPADM

## 2024-03-04 RX ORDER — ROCURONIUM BROMIDE 10 MG/ML
INJECTION, SOLUTION INTRAVENOUS PRN
Status: DISCONTINUED | OUTPATIENT
Start: 2024-03-04 | End: 2024-03-04 | Stop reason: SURG

## 2024-03-04 RX ORDER — DIPHENHYDRAMINE HYDROCHLORIDE 50 MG/ML
12.5 INJECTION INTRAMUSCULAR; INTRAVENOUS
Status: DISCONTINUED | OUTPATIENT
Start: 2024-03-04 | End: 2024-03-04 | Stop reason: HOSPADM

## 2024-03-04 RX ORDER — HYDROMORPHONE HYDROCHLORIDE 1 MG/ML
0.4 INJECTION, SOLUTION INTRAMUSCULAR; INTRAVENOUS; SUBCUTANEOUS
Status: DISCONTINUED | OUTPATIENT
Start: 2024-03-04 | End: 2024-03-04 | Stop reason: HOSPADM

## 2024-03-04 RX ORDER — SODIUM CHLORIDE, SODIUM LACTATE, POTASSIUM CHLORIDE, CALCIUM CHLORIDE 600; 310; 30; 20 MG/100ML; MG/100ML; MG/100ML; MG/100ML
INJECTION, SOLUTION INTRAVENOUS CONTINUOUS
Status: ACTIVE | OUTPATIENT
Start: 2024-03-04 | End: 2024-03-04

## 2024-03-04 RX ORDER — CEFAZOLIN SODIUM 1 G/3ML
INJECTION, POWDER, FOR SOLUTION INTRAMUSCULAR; INTRAVENOUS PRN
Status: DISCONTINUED | OUTPATIENT
Start: 2024-03-04 | End: 2024-03-04 | Stop reason: SURG

## 2024-03-04 RX ORDER — HYDROMORPHONE HYDROCHLORIDE 1 MG/ML
0.5 INJECTION, SOLUTION INTRAMUSCULAR; INTRAVENOUS; SUBCUTANEOUS
Status: DISCONTINUED | OUTPATIENT
Start: 2024-03-04 | End: 2024-03-04 | Stop reason: HOSPADM

## 2024-03-04 RX ORDER — LIDOCAINE HYDROCHLORIDE 40 MG/ML
SOLUTION TOPICAL PRN
Status: DISCONTINUED | OUTPATIENT
Start: 2024-03-04 | End: 2024-03-04 | Stop reason: SURG

## 2024-03-04 RX ORDER — HALOPERIDOL 5 MG/ML
1 INJECTION INTRAMUSCULAR
Status: DISCONTINUED | OUTPATIENT
Start: 2024-03-04 | End: 2024-03-04 | Stop reason: HOSPADM

## 2024-03-04 RX ORDER — EPHEDRINE SULFATE 50 MG/ML
5 INJECTION, SOLUTION INTRAVENOUS
Status: DISCONTINUED | OUTPATIENT
Start: 2024-03-04 | End: 2024-03-04 | Stop reason: HOSPADM

## 2024-03-04 RX ORDER — MIDAZOLAM HYDROCHLORIDE 1 MG/ML
INJECTION INTRAMUSCULAR; INTRAVENOUS PRN
Status: DISCONTINUED | OUTPATIENT
Start: 2024-03-04 | End: 2024-03-04 | Stop reason: SURG

## 2024-03-04 RX ORDER — MEPERIDINE HYDROCHLORIDE 25 MG/ML
12.5 INJECTION INTRAMUSCULAR; INTRAVENOUS; SUBCUTANEOUS
Status: DISCONTINUED | OUTPATIENT
Start: 2024-03-04 | End: 2024-03-04 | Stop reason: HOSPADM

## 2024-03-04 RX ORDER — OXYCODONE HCL 5 MG/5 ML
5 SOLUTION, ORAL ORAL
Status: COMPLETED | OUTPATIENT
Start: 2024-03-04 | End: 2024-03-04

## 2024-03-04 RX ADMIN — LIDOCAINE HYDROCHLORIDE 80 MG: 20 INJECTION, SOLUTION EPIDURAL; INFILTRATION; INTRACAUDAL at 13:26

## 2024-03-04 RX ADMIN — KETOROLAC TROMETHAMINE 15 MG: 15 INJECTION, SOLUTION INTRAMUSCULAR; INTRAVENOUS at 14:16

## 2024-03-04 RX ADMIN — DEXAMETHASONE SODIUM PHOSPHATE 8 MG: 4 INJECTION INTRA-ARTICULAR; INTRALESIONAL; INTRAMUSCULAR; INTRAVENOUS; SOFT TISSUE at 13:30

## 2024-03-04 RX ADMIN — SODIUM CHLORIDE, POTASSIUM CHLORIDE, SODIUM LACTATE AND CALCIUM CHLORIDE: 600; 310; 30; 20 INJECTION, SOLUTION INTRAVENOUS at 13:22

## 2024-03-04 RX ADMIN — PROPOFOL 150 MG: 10 INJECTION, EMULSION INTRAVENOUS at 13:26

## 2024-03-04 RX ADMIN — FENTANYL CITRATE 100 MCG: 50 INJECTION, SOLUTION INTRAMUSCULAR; INTRAVENOUS at 13:26

## 2024-03-04 RX ADMIN — OXYCODONE HYDROCHLORIDE 5 MG: 5 SOLUTION ORAL at 14:49

## 2024-03-04 RX ADMIN — MIDAZOLAM HYDROCHLORIDE 2 MG: 1 INJECTION, SOLUTION INTRAMUSCULAR; INTRAVENOUS at 13:22

## 2024-03-04 RX ADMIN — FENTANYL CITRATE 25 MCG: 50 INJECTION, SOLUTION INTRAMUSCULAR; INTRAVENOUS at 14:49

## 2024-03-04 RX ADMIN — ROCURONIUM BROMIDE 50 MG: 50 INJECTION, SOLUTION INTRAVENOUS at 13:27

## 2024-03-04 RX ADMIN — SUGAMMADEX 200 MG: 100 INJECTION, SOLUTION INTRAVENOUS at 14:19

## 2024-03-04 RX ADMIN — ONDANSETRON 4 MG: 2 INJECTION INTRAMUSCULAR; INTRAVENOUS at 14:16

## 2024-03-04 RX ADMIN — LIDOCAINE HYDROCHLORIDE 4 ML: 40 SOLUTION TOPICAL at 13:27

## 2024-03-04 RX ADMIN — CEFAZOLIN 2 G: 1 INJECTION, POWDER, FOR SOLUTION INTRAMUSCULAR; INTRAVENOUS at 13:30

## 2024-03-04 ASSESSMENT — PAIN DESCRIPTION - PAIN TYPE
TYPE: SURGICAL PAIN

## 2024-03-04 ASSESSMENT — FIBROSIS 4 INDEX: FIB4 SCORE: 0.65

## 2024-03-04 NOTE — DISCHARGE INSTRUCTIONS
HOME CARE INSTRUCTIONS    ACTIVITY: Rest and take it easy for the first 24 hours.  A responsible adult is recommended to remain with you during that time.  It is normal to feel sleepy.  We encourage you to not do anything that requires balance, judgment or coordination.    FOR 24 HOURS DO NOT:  Drive, operate machinery or run household appliances.  Drink beer or alcoholic beverages.  Make important decisions or sign legal documents.      DIET: To avoid nausea, slowly advance diet as tolerated, avoiding spicy or greasy foods for the first day.  Add more substantial food to your diet according to your physician's instructions. INCREASE FLUIDS AND FIBER TO AVOID CONSTIPATION.    SURGICAL DRESSING/BATHING: okay to shower after tomorrow, no hot tubs/tub baths/swimming until cleared by MD    MEDICATIONS: Resume taking daily medication.  Take prescribed pain medication with food.  If no medication is prescribed, you may take non-aspirin pain medication if needed.  PAIN MEDICATION CAN BE VERY CONSTIPATING.  Take a stool softener or laxative such as senokot, pericolace, or milk of magnesia if needed.    .  Last pain medication given at Toradol (Ibuprofen/Motrin like medication) @ 2:16 PM, Next Ibuprofen okay to take @ 8:16 PM, .    A follow-up appointment should be arranged with your doctor; call to schedule.    You should CALL YOUR PHYSICIAN if you develop:  Fever greater than 101 degrees F.  Pain not relieved by medication, or persistent nausea or vomiting.  Excessive bleeding (blood soaking through dressing) or unexpected drainage from the wound.  Extreme redness or swelling around the incision site, drainage of pus or foul smelling drainage.  Inability to urinate or empty your bladder within 8 hours.      You should call 911 if you develop problems with breathing or chest pain.  If you are unable to contact your doctor or surgical center, you should go to the nearest emergency room or urgent care center.      Physician's  telephone #: Dr. Lucero @ [phone]    MILD FLU-LIKE SYMPTOMS ARE NORMAL.  YOU MAY EXPERIENCE GENERALIZED MUSCLE ACHES, THROAT IRRITATION, HEADACHE AND/OR SOME NAUSEA.    If any questions arise, call your doctor.  If your doctor is not available, please feel free to call the Surgical Center at [phone].  The Center is open Monday through Friday from 7AM to 7PM.      A registered nurse may call you a few days after your surgery to see how you are doing after your procedure.    You may also receive a survey in the mail within the next two weeks and we ask that you take a few moments to complete the survey and return it to us.  Our goal is to provide you with very good care and we value your comments.     Depression / Suicide Risk    As you are discharged from this [facility] facility, it is important to learn how to keep safe from harming yourself.    Recognize the warning signs:  Abrupt changes in personality, positive or negative- including increase in energy   Giving away possessions  Change in eating patterns- significant weight changes-  positive or negative  Change in sleeping patterns- unable to sleep or sleeping all the time   Unwillingness or inability to communicate  Depression  Unusual sadness, discouragement and loneliness  Talk of wanting to die  Neglect of personal appearance   Rebelliousness- reckless behavior  Withdrawal from people/activities they love  Confusion- inability to concentrate     If you or a loved one observes any of these behaviors or has concerns about self-harm, here's what you can do:  Talk about it- your feelings and reasons for harming yourself  Remove any means that you might use to hurt yourself (examples: pills, rope, extension cords, firearm)  Get professional help from the community (Mental Health, Substance Abuse, psychological counseling)  Do not be alone:Call your Safe Contact- someone whom you trust who will be there for you.  Call your local CRISIS HOTLINE  621-9785 or 240-603-3335  Call your local Children's Mobile Crisis Response Team [place] (588) 863-8752 or www.[employer]  Call the toll free National Suicide Prevention Hotlines   National Suicide Prevention Lifeline 492-554-MOVK (0576)  National [employer] Line Network 800-SUICIDE (126-4569)    I acknowledge receipt and understanding of these Home Care instructions.

## 2024-03-04 NOTE — ANESTHESIA PREPROCEDURE EVALUATION
" Case: [id number] Date/Time: 24 1145    Procedure: LAPAROSCOPIC BILATERAL SALPINGECTOMY, POSSIBLE LYSIS OF ADHESIONS    Pre-op diagnosis: PERMANENT STERILIZATION    Location: [facility] ROOM 27 / SURGERY SAME DAY [facility]    Surgeons: Vicki Lucero D.O.            Relevant Problems   Other   (positive) History of pericarditis - cardiology appt wnl, no f/u or recommendations needed   (positive) Hx of preeclampsia, prior pregnancy - C/S at 40wk, ASA started   (positive) Multiple allergies - \"all vaccines\" and PCN, tape     Anes H&P:  PAST MEDICAL HISTORY:   29 y.o. female who presents for Procedure(s):  LAPAROSCOPIC BILATERAL SALPINGECTOMY, POSSIBLE LYSIS OF ADHESIONS.  She has current and past medical problems significant for:    Past Medical History:   Diagnosis Date    Arrhythmia     Skip a beat    GERD (gastroesophageal reflux disease)     Heart murmur     Migraine     Pregnant     Seizure (HCC)     Last seizure ?       SMOKING/ALCOHOL/RECREATIONAL DRUG USE:  Social History     Tobacco Use    Smoking status: Never     Passive exposure: Never    Smokeless tobacco: Never   Vaping Use    Vaping Use: Never used   Substance Use Topics    Alcohol use: Never    Drug use: Never     Social History     Substance and Sexual Activity   Drug Use Never       PAST SURGICAL HISTORY:  Past Surgical History:   Procedure Laterality Date    REPEAT C SECTION N/A 2023    Procedure:  SECTION, REPEAT;  Surgeon: Vicki Lucero D.O.;  Location: SURGERY LABOR AND DELIVERY;  Service: Obstetrics    PRIMARY C SECTION  2018    APPENDECTOMY      in 7thgrade    OTHER      Tonsils, adenoids,       ALLERGIES:   Allergies   Allergen Reactions    Influenza Vaccines      Pt states ends in the hospital with fever 105    Penicillins      Happened when she was 3 years old. Unknown reaction    Tetanus-Diphth-Acell Pertussis      \"Ends up in hospital with fever of 105\"       MEDICATIONS:  No current facility-administered " medications on file prior to encounter.     Current Outpatient Medications on File Prior to Encounter   Medication Sig Dispense Refill    simethicone (MYLICON) 125 MG chewable tablet Chew 1 Tablet 4 times a day as needed for Flatulence for up to 10 doses. (Patient not taking: Reported on 12/4/2023) 10 Tablet 0    aspirin (ASA) 81 MG Chew Tab chewable tablet Chew 81 mg every day.      Prenatal MV-Min-Fe Fum-FA-DHA (PRENATAL 1 PO) Take  by mouth.         LABS:  Lab Results   Component Value Date/Time    HEMOGLOBIN 15.4 02/12/2024 0848    HEMATOCRIT 44.9 02/12/2024 0848    WBC 5.7 02/12/2024 0848     Lab Results   Component Value Date/Time    SODIUM 144 02/12/2024 0848    POTASSIUM 4.0 02/12/2024 0848    CHLORIDE 104 02/12/2024 0848    CO2 27 02/12/2024 0848    GLUCOSE 84 02/12/2024 0848    BUN 21 02/12/2024 0848    CALCIUM 9.2 02/12/2024 0848         PREVIOUS ANESTHETICS: See EMR  __________________________________________      Physical Exam    Airway   Mallampati: I  TM distance: >3 FB  Neck ROM: full       Cardiovascular - normal exam  Rhythm: regular  Rate: normal  (-) murmur     Dental - normal exam           Pulmonary - normal exam  Breath sounds clear to auscultation     Abdominal    Neurological - normal exam                   Anesthesia Plan    ASA 2       Plan - general       Airway plan will be ETT          Induction: intravenous    Postoperative Plan: Postoperative administration of opioids is intended.    Pertinent diagnostic labs and testing reviewed    Informed Consent:    Anesthetic plan and risks discussed with patient.    Use of blood products discussed with: patient whom consented to blood products.

## 2024-03-04 NOTE — OP REPORT
PreOp Diagnosis: 1. Undesired fertility 2. Omental adhesions to anterior abdominal wall      PostOp Diagnosis: same      Procedure(s):  LAPAROSCOPIC BILATERAL SALPINGECTOMY, POSSIBLE LYSIS OF ADHESIONS - Wound Class: Clean    Surgeon(s):  BRENDA Butler D.O.    Anesthesiologist/Type of Anesthesia:  Anesthesiologist: Antoni Coyle M.D./General    Surgical Staff:  Circulator: Chela Nicole R.N.  Relief Circulator: Urszula Alvarado R.N.  Scrub Person: Kendal Thomas  Count [place]: Ofelia Vega    Specimens removed if any:  ID Type Source Tests Collected by Time Destination   A : bilateral fallopian tubes Tissue Fallopian Tube PATHOLOGY SPECIMEN Vicki Lucero D.O. 3/4/2024  2:19 PM        Estimated Blood Loss: minimal    Findings: NEFG, uterus anteverted and sounded to 9 cm, omental adhesion to anterior abdominal wall and uterus adhered to anterior abdominal wall. Ovaries and fallopian tubes appeared normal.     Complications: none    Operative Report    PROCEDURE:  The patient was taken to the operating room where general    endotracheal anesthesia was applied.  The patient was placed in the dorsal    lithotomy position with Emery stirrups. The patient was prepped and draped in   the normal sterile fashion.  The cervix was visualized using a weighted speculum and right angle rectractor. The Humi manipulator was then inserted without any difficulty as a means to manipulate the cervix. OG tube established.      The procedure went towards the abdomen. Marcaine was injected into at Cheng's point and an 5 mm incision was made to the skin.  Direct entry was obtained  Pneumoperitoneum was established with CO2 gas to a pressure of 15 mmHg. Intraperiotneal placement was confirmed with visualization. 3 additional 5 mm ports were then placed under direct visualization by infiltration with Marcaine, incising the skin with a scalpel, and insertion of 5 mm ports under direct laparoscopic  visualization. The ligasure device was then introduced into the left lower quadrant port and with the assistance of an atraumatic grasper, the left fallopian tube was elevated and the mesosalpinx was coagulated and cut to the level of the isthmus. The omental adhesion at the midline had to be partially taken down to allow for the ligasure to access the right fallopian tube. The right fallopian tube was then removed in a similar fashion to the left.  The ovaries were then examined again and noted to be within normal limits.  Survey of the abdomen revealed excellent hemostasis. At this point, the CO2 gas was released.  There was no bleeding noted at the operative sites or anywhere else in the abdomen.     All instruments were removed from the abdomen. The skin was    reapproximated with 4-0 Monocryl subcuticular stitch.  Sponge, needle,    instrument, and lap counts were correct x2.  Patient tolerated the procedure    well and went to recovery room in stable condition.

## 2024-03-04 NOTE — OR NURSING
1436 patient arrived from OR, report received, attached to monitoring. VSS, patient oxygenating well on 6 L simple mask, surgical site to abdomen x4 with dermabond, steri strips, gauze and tegaderm, clean/dry/intact, peripad in place with minimal drainage, patient arrives asleep    1449 patient awakens co pain, medicated per EMAR     1450 patient tolerates sips of water, denies nausea      1510 patient's significant other, Ollie brought to bedside     1512 dc instructions discussed with patient and Ollie, questions answered    1515 patient up to bathroom to void, void successful    1520 patient dressed with assistance    1536 patient stable and meeting dc criteria at this time, vss, surgical sites remain clean/dry/intact, piv dc tip intact, all belongings with patient and accounted for, escorted out via wheelchair with this RN and Ollie

## 2024-03-04 NOTE — ANESTHESIA POSTPROCEDURE EVALUATION
Patient: Khris Mays    Procedure Summary       Date: 03/04/24 Room / Location: [facility] ROOM 27 / SURGERY SAME DAY [facility]    Anesthesia Start: 1322 Anesthesia Stop: 1445    Procedure: LAPAROSCOPIC BILATERAL SALPINGECTOMY, POSSIBLE LYSIS OF ADHESIONS (Pelvis) Diagnosis: (PERMANENT STERILIZATION)    Surgeons: Vicki Lucero D.O. Responsible Provider: Antoni Coyle M.D.    Anesthesia Type: general ASA Status: 2            Final Anesthesia Type: general  Last vitals  BP   Blood Pressure: (!) 138/94    Temp   36.6 °C (97.8 °F)    Pulse   97   Resp   17    SpO2   96 %      Anesthesia Post Evaluation    Patient location during evaluation: PACU  Patient participation: complete - patient participated  Level of consciousness: sleepy but conscious    Airway patency: patent  Anesthetic complications: no  Cardiovascular status: hemodynamically stable  Respiratory status: acceptable  Hydration status: balanced    PONV: none          No notable events documented.     Nurse Pain Score: 3 (NPRS)

## 2024-03-04 NOTE — OR SURGEON
Immediate Post OP Note    PreOp Diagnosis: 1. Undesired fertility 2. Omental adhesions to anterior abdominal wall      PostOp Diagnosis: same      Procedure(s):  LAPAROSCOPIC BILATERAL SALPINGECTOMY, POSSIBLE LYSIS OF ADHESIONS - Wound Class: Clean    Surgeon(s):  BRENDA Butler D.O.    Anesthesiologist/Type of Anesthesia:  Anesthesiologist: Antoni Coyle M.D./General    Surgical Staff:  Circulator: Chela Nicole R.N.  Relief Circulator: Urszula Alvarado R.N.  Scrub Person: Kendal Pardo [place]: Ofelia Vega    Specimens removed if any:  ID Type Source Tests Collected by Time Destination   A : bilateral fallopian tubes Tissue Fallopian Tube PATHOLOGY SPECIMEN Vicki Lucero D.O. 3/4/2024  2:19 PM        Estimated Blood Loss: minimal    Findings: NEFG, uterus anteverted and sounded to 9 cm, omental adhesion to anterior abdominal wall and uterus adhered to anterior abdominal wall. Ovaries and fallopian tubes appeared normal.     Complications: none        3/4/2024 2:48 PM Vicki Lucero D.O.

## 2024-03-04 NOTE — H&P
"History and Physical Exam    No chief complaint on file.      History of present illness: 29 y.o. presents with desire for sterilization and desires surgical therapy. Attempted sterilization at time of  section, but dense adhesions prevented access to fallopian tubes. Therefore some lysis of adhesions is expected. Risks, benefits and alternative therapies have been discussed and patient still desires surgical therapy. Questions answered.    Pertinent positives documented in HPI and all other systems reviewed & are negative      All PMH, PSH, allergies, social history and FH reviewed and updated today:  Past Medical History:   Diagnosis Date    Arrhythmia     Skip a beat    GERD (gastroesophageal reflux disease)     Heart murmur     Migraine     Pregnant     Seizure (HCC)     Last seizure ?       Past Surgical History:   Procedure Laterality Date    REPEAT C SECTION N/A 2023    Procedure:  SECTION, REPEAT;  Surgeon: Vicki Lucero D.O.;  [facility];  Service: Obstetrics    PRIMARY C SECTION  2018    APPENDECTOMY      in 5thgrade    OTHER      Tonsils, adenoids,       No current facility-administered medications for this encounter.       Allergies:   Allergies   Allergen Reactions    Influenza Vaccines      Pt states ends in the hospital with fever 105    Penicillins      Happened when she was 3 years old. Unknown reaction    Tetanus-Diphth-Acell Pertussis      \"Ends up in hospital with fever of 105\"       Social History     Socioeconomic History    Marital status:      Spouse name: Not on file    Number of children: Not on file    Years of education: Not on file    Highest education level: Not on file   Occupational History    Not on file   Tobacco Use    Smoking status: Never     Passive exposure: Never    Smokeless tobacco: Never   Vaping Use    Vaping Use: Never used   Substance and Sexual Activity    Alcohol use: Never    Drug use: Never " "   Sexual activity: Yes     Partners: Male     Comment: none   Other Topics Concern    Not on file   Social History Narrative    Not on file     Social Determinants of Health     Financial Resource Strain: Not on file   Food Insecurity: Not on file   Transportation Needs: Not on file   Physical Activity: Not on file   Stress: Not on file   Social Connections: Not on file   Intimate Partner Violence: Not on file   Housing Stability: Not on file       Family History   Problem Relation Age of Onset    No Known Problems Mother     Blood Disease Maternal Grandmother         blood clot    Heart Disease Maternal Grandmother     No Known Problems Maternal Grandfather     Heart Disease Paternal Grandmother     Cancer Paternal Grandmother         Skin cancer    Hypertension Paternal Grandmother     Ovarian Cancer Sister        Physical exam:  /73   Pulse 80   Temp 36.2 °C (97.1 °F) (Temporal)   Resp 17   Ht 5' 4\"   Wt 161 lb 2.5 oz   SpO2 98%     GENERAL APPEARANCE: healthy, alert, no distress  HEART no peripheral edema  LUNG normal respiratory effort  ABDOMEN Abdomen soft, non-tender.   EXTREMITIES:negative clubbing, cyanosis, edema    NEURO Awake, alert and oriented x 3, Normal gait, no sensory deficits  SKIN No rashes, or ulcers or lesions seen  PSYCHIATRIC: Patient shows appropriate affect, is alert and oriented x3, intact judgment and insight.        No diagnosis found.    Khris Mays is a 29 y.o.  female with desire for sterilization who presents for surgical consultation for a laparoscopic bilateral salpingectomy with possible lysis of adhesions. She understands this procedure is permanent and not reversible.    After discussion of risks and benefits, all questions regarding procedure were answered for patient. Consents were signed.    Needs to be NPO after midnight day of surgery. Clean abdomen and umbilicus w/ antibacterial soap morning of surgery.    Postoperative course discussed with patient. " Patient should return to office or emergency room for #1 fever greater than 101, #2 heavy vaginal bleeding soaking greater than one pad per hour, #3 uncontrolled pain, #4 inability to tolerate regular diet pass gas or moved bowels.    Follow up in 2 weeks after surgery for postop check.     Spent 30 mins with the patient with over 50% of the time discussing the procedure, risk and benefits and obtaining consent.

## 2024-03-04 NOTE — ANESTHESIA TIME REPORT
Anesthesia Start and Stop Event Times       Date Time Event    3/4/2024 1311 Ready for Procedure     1322 Anesthesia Start     1445 Anesthesia Stop          Responsible Staff  03/04/24      Name Role Begin End    Antoni Coyle M.D. Anesth 1322 1445          Overtime Reason:  no overtime (within assigned shift)    Comments:

## 2024-03-04 NOTE — ANESTHESIA PROCEDURE NOTES
Airway    Date/Time: 3/4/2024 1:28 PM    Performed by: Antoni Coyle M.D.  Authorized by: Antoni Coyle M.D.    Location:  OR  Urgency:  Elective  Difficult Airway: No    Indications for Airway Management:  Anesthesia      Spontaneous Ventilation: absent    Sedation Level:  Deep  Preoxygenated: Yes    Patient Position:  Sniffing  Mask Difficulty Assessment:  1 - vent by mask  Final Airway Type:  Endotracheal airway  Final Endotracheal Airway:  ETT  Cuffed: Yes    Technique Used for Successful ETT Placement:  Direct laryngoscopy    Insertion Site:  Oral  Blade Type:  Miguel  Laryngoscope Blade/Videolaryngoscope Blade Size:  2  ETT Size (mm):  7.0  Measured from:  Teeth  ETT to Teeth (cm):  21  Placement Verified by: auscultation and capnometry    Cormack-Lehane Classification:  Grade I - full view of glottis  Number of Attempts at Approach:  1

## 2024-03-05 ENCOUNTER — TELEPHONE (OUTPATIENT)
Dept: GYNECOLOGY | Facility: CLINIC | Age: 29
End: 2024-03-05
Payer: MEDICAID

## 2024-03-05 NOTE — TELEPHONE ENCOUNTER
Post-operative phone call    I called Ms. Mays and confirmed her identity. She is POD 1 s/p Laparoscopic bilateral salpingectomy, Lysis of adhesions.     She reports doing well, pain controlled, ambulating a little today, tolerating regular diet, not passing gas or having bowel movement, but is emptying her bladder well. Denies nausea, vomiting, fever, chills, SOB, or heavy vaginal bleeding.   All questions answered. Advised patient to reach out via [facility] or call the office at [phone] should she have any questions or concerns prior to f/u.     She will follow up as scheduled on 3/22/24 with Dr. Lucero, or earlier if any issues arise.      NATALIA Dominguez, RNFA

## 2024-03-22 ENCOUNTER — GYNECOLOGY VISIT (OUTPATIENT)
Dept: OBGYN | Facility: CLINIC | Age: 29
End: 2024-03-22
Payer: MEDICAID

## 2024-03-22 VITALS — DIASTOLIC BLOOD PRESSURE: 82 MMHG | SYSTOLIC BLOOD PRESSURE: 128 MMHG | BODY MASS INDEX: 28.39 KG/M2 | WEIGHT: 165.4 LBS

## 2024-03-22 DIAGNOSIS — Z98.890 STATUS POST SURGERY: ICD-10-CM

## 2024-03-22 PROCEDURE — 99024 POSTOP FOLLOW-UP VISIT: CPT | Performed by: OBSTETRICS & GYNECOLOGY

## 2024-03-22 PROCEDURE — 3079F DIAST BP 80-89 MM HG: CPT | Performed by: OBSTETRICS & GYNECOLOGY

## 2024-03-22 PROCEDURE — 3074F SYST BP LT 130 MM HG: CPT | Performed by: OBSTETRICS & GYNECOLOGY

## 2024-03-22 ASSESSMENT — FIBROSIS 4 INDEX: FIB4 SCORE: 0.65

## 2024-03-22 NOTE — PROGRESS NOTES
SUBJECTIVE:  Khris Mays presents to the clinic 2 weeks following laparoscopic bilateral salpingectomy with lysis of adhesions.    Eating a regular diet without difficulty. Bowel movement are Normal.  The patient is not having any pain. Patient Denies Incisional pain, drainage or redness    OBJECTIVE:  /82 (BP Location: Right arm, Patient Position: Sitting, BP Cuff Size: Adult)   Wt 165 lb 6.4 oz   LMP 02/20/2023   Breastfeeding Yes   BMI 28.39 kg/m²   Current Outpatient Medications on File Prior to Visit   Medication Sig Dispense Refill    pyridoxine (VITAMIN B-6) 50 MG Tab Take 50 mg by mouth every day.      aspirin (ASA) 81 MG Chew Tab chewable tablet Chew 81 mg every day.      Prenatal MV-Min-Fe Fum-FA-DHA (PRENATAL 1 PO) Take  by mouth.      simethicone (MYLICON) 125 MG chewable tablet Chew 1 Tablet 4 times a day as needed for Flatulence for up to 10 doses. (Patient not taking: Reported on 12/4/2023) 10 Tablet 0     No current facility-administered medications on file prior to visit.       Constitutional:  alert, healthy, no distress.  Abdomen:  soft, bowel sounds active, non-tender.  Incision:  healing well, no drainage, no erythema, no hernia, no seroma, no swelling, no dehiscence, incision well approximated.    IMPRESSION: Doing well postoperatively.  Pt is to increase activities as tolerated.    Lab:   Recent Results (from the past 1008 hour(s))   CBC WITH DIFFERENTIAL    Collection Time: 02/12/24  8:48 AM   Result Value Ref Range    WBC 5.7 4.8 - 10.8 K/uL    RBC 4.98 4.20 - 5.40 M/uL    Hemoglobin 15.4 12.0 - 16.0 g/dL    Hematocrit 44.9 37.0 - 47.0 %    MCV 90.2 81.4 - 97.8 fL    MCH 30.9 27.0 - 33.0 pg    MCHC 34.3 32.2 - 35.5 g/dL    RDW 41.9 35.9 - 50.0 fL    Platelet Count 283 164 - 446 K/uL    MPV 10.4 9.0 - 12.9 fL    Neutrophils-Polys 60.20 44.00 - 72.00 %    Lymphocytes 32.00 22.00 - 41.00 %    Monocytes 4.90 0.00 - 13.40 %    Eosinophils 2.10 0.00 - 6.90 %    Basophils 0.40  0.00 - 1.80 %    Immature Granulocytes 0.40 0.00 - 0.90 %    Nucleated RBC 0.00 0.00 - 0.20 /100 WBC    Neutrophils (Absolute) 3.43 1.82 - 7.42 K/uL    Lymphs (Absolute) 1.82 1.00 - 4.80 K/uL    Monos (Absolute) 0.28 0.00 - 0.85 K/uL    Eos (Absolute) 0.12 0.00 - 0.51 K/uL    Baso (Absolute) 0.02 0.00 - 0.12 K/uL    Immature Granulocytes (abs) 0.02 0.00 - 0.11 K/uL    NRBC (Absolute) 0.00 K/uL   Basic Metabolic Panel    Collection Time: 02/12/24  8:48 AM   Result Value Ref Range    Sodium 144 135 - 145 mmol/L    Potassium 4.0 3.6 - 5.5 mmol/L    Chloride 104 96 - 112 mmol/L    Co2 27 20 - 33 mmol/L    Glucose 84 65 - 99 mg/dL    Bun 21 8 - 22 mg/dL    Creatinine 0.82 0.50 - 1.40 mg/dL    Calcium 9.2 8.5 - 10.5 mg/dL    Anion Gap 13.0 7.0 - 16.0   ESTIMATED GFR    Collection Time: 02/12/24  8:48 AM   Result Value Ref Range    GFR (CKD-EPI) 99 >60 mL/min/1.73 m 2   HCG QUALITATIVE URINE (Pregnancy)    Collection Time: 02/12/24  8:49 AM   Result Value Ref Range    Beta-Hcg Urine Negative Negative   HCG Qualitative Ur    Collection Time: 03/04/24 10:38 AM   Result Value Ref Range    Beta-Hcg Urine Negative Negative   Histology Request    Collection Time: 03/04/24  2:19 PM   Result Value Ref Range    Pathology Request Sent to Hist[facility]        PLAN:  Continue any current medications.  (See Med List for details.)  Return to clinic  : prn if symptoms worsen or fail to improve.

## (undated) DEVICE — ELECTRODE DUAL RETURN W/ CORD - (50/PK)

## (undated) DEVICE — TUBING CLEARLINK DUO-VENT - C-FLO (48EA/CA)

## (undated) DEVICE — GLOVE BIOGEL SZ 6 PF LATEX - (50EA/BX 4BX/CA)

## (undated) DEVICE — GOWN SURGEONS LARGE - (32/CA)

## (undated) DEVICE — SLEEVE VASO CALF MED - (10PR/CA)

## (undated) DEVICE — KIT  I.V. START (100EA/CA)

## (undated) DEVICE — CHLORAPREP 26 ML APPLICATOR - ORANGE TINT(25/CA)

## (undated) DEVICE — LACTATED RINGERS INJ 1000 ML - (14EA/CA 60CA/PF)

## (undated) DEVICE — SET TUBING PNEUMOCLEAR HIGH FLOW SMOKE EVACUATION (10EA/BX)

## (undated) DEVICE — DRAPESURG STERI-DRAPE LONG - (10/BX 4BX/CA)

## (undated) DEVICE — SENSOR OXIMETER ADULT SPO2 RD SET (20EA/BX)

## (undated) DEVICE — CATHETER IV NON-SAFETY 18 GA X 1 1/4 (50/BX 4BX/CA)

## (undated) DEVICE — SOLUTION PLASMA-LYTE PH 7.4 INJ 1000ML  (14EA/CA)

## (undated) DEVICE — WATER IRRIGATION STERILE 1000ML (12EA/CA)

## (undated) DEVICE — SUTURE 0 VICRYL PLUS CT-1 - 36 INCH (36/BX)

## (undated) DEVICE — RETRACTOR O C SECTION LRY - (5/BX)

## (undated) DEVICE — SUTURE 4-0 MONOCRYL PLUS PS-1 - 27 INCH (36/BX)

## (undated) DEVICE — SUTURE GENERAL

## (undated) DEVICE — PACK C-SECTION (2EA/CA)

## (undated) DEVICE — SUTURE 3-0 VICRYL PLUS CT-1 - 36 INCH (36/BX)

## (undated) DEVICE — TRAY SPINAL ANESTHESIA NON-SAFETY (10/CA)

## (undated) DEVICE — PACK ROOM TURNOVER L&D (12/CA)

## (undated) DEVICE — MASK OXYGEN VNYL ADLT MED CONC WITH 7 FOOT TUBING  - (50EA/CA)

## (undated) DEVICE — LIGASURE SM JAW SEALER CRVD - (6EA/CA)

## (undated) DEVICE — GOWN WARMING STANDARD FLEX - (30/CA)

## (undated) DEVICE — SUTURE 3-0 MONOCRYL CT-1 ABS - 36IN (36/BX)

## (undated) DEVICE — PAD SANITARY 11IN MAXI IND WRAPPED  (12EA/PK 24PK/CA)

## (undated) DEVICE — TUBE E-T HI-LO CUFF 7.0MM (10EA/PK)

## (undated) DEVICE — BLANKET WARMING LOWER BODY (10EA/CA)

## (undated) DEVICE — TUBE CONNECTING SUCTION - CLEAR PLASTIC STERILE 72 IN (50EA/CA)

## (undated) DEVICE — SUTURE 4-0 MONOCRYL PLUS PS-2 - 27 INCH (36/BX)

## (undated) DEVICE — STERI STRIP COMPOUND BENZOIN - TINCTURE 0.6ML WITH APPLICATOR (40EA/BX)

## (undated) DEVICE — CLOSURE SKIN STRIP 1/2 X 4 IN - (STERI STRIP) (50/BX 4BX/CA)

## (undated) DEVICE — SUTURE ABSORBABLE MONOFILAMENT VIOLET MONOCRYL CT-1 L36 IN (36EA/BX)

## (undated) DEVICE — SUCTION INSTRUMENT YANKAUER BULBOUS TIP W/O VENT (50EA/CA)

## (undated) DEVICE — SODIUM CHL IRRIGATION 0.9% 1000ML (12EA/CA)

## (undated) DEVICE — TROCAR 5X100 SEPARTATOR ADV - FIXATION (6/BX)

## (undated) DEVICE — TOWEL STOP TIMEOUT SAFETY FLAG (40EA/CA)

## (undated) DEVICE — SPONGE GAUZESTER. 2X2 4-PL - (2/PK 50PK/BX 30BX/CS)

## (undated) DEVICE — GLOVE SZ 6.5 BIOGEL PI MICRO - PF LF (50PR/BX)

## (undated) DEVICE — SLEEVE, SEQUENTIAL CALF REG

## (undated) DEVICE — SUTURE 2-0 MONOCRYL CT-1

## (undated) DEVICE — DRESSING TRANSPARENT FILM TEGADERM 2.375 X 2.75"  (100EA/BX)"

## (undated) DEVICE — CANNULA O2 COMFORT SOFT EAR ADULT 7 FT TUBING (50/CA)

## (undated) DEVICE — CANISTER SUCTION RIGID RED 1500CC (40EA/CA)

## (undated) DEVICE — UTERINE MANIPULATOR 4.5MM ZSI - 1151 12/BX

## (undated) DEVICE — Device

## (undated) DEVICE — GLOVE BIOGEL SZ 6.5 SURGICAL PF LTX (50PR/BX 4BX/CA)

## (undated) DEVICE — SET LEADWIRE 5 LEAD BEDSIDE DISPOSABLE ECG (1SET OF 5/EA)

## (undated) DEVICE — TROCAR 5X100 BLADED ADVANCE - FIXATION (6/BX)

## (undated) DEVICE — SET EXTENSION WITH 2 PORTS (48EA/CA) ***PART #2C8610 IS A SUBSTITUTE*****

## (undated) DEVICE — HEAD HOLDER JUNIOR/ADULT

## (undated) DEVICE — GLOVE BIOGEL PI INDICATOR SZ 6.5 SURGICAL PF LF - (50/BX 4BX/CA)

## (undated) DEVICE — CANISTER SUCTION 3000ML MECHANICAL FILTER AUTO SHUTOFF MEDI-VAC NONSTERILE LF DISP  (40EA/CA)

## (undated) DEVICE — NEEDLE INSFL 120MM 14GA VRRS - (20/BX)

## (undated) DEVICE — PLUMEPEN ULTRA 3/8 IN X 10 FT HOSE (20EA/CA)

## (undated) DEVICE — DRESSING POST OP BORDER 4 X 10 (5EA/BX)